# Patient Record
Sex: MALE | Race: BLACK OR AFRICAN AMERICAN | NOT HISPANIC OR LATINO | URBAN - METROPOLITAN AREA
[De-identification: names, ages, dates, MRNs, and addresses within clinical notes are randomized per-mention and may not be internally consistent; named-entity substitution may affect disease eponyms.]

---

## 2023-10-02 ENCOUNTER — OUTPATIENT (OUTPATIENT)
Dept: OUTPATIENT SERVICES | Facility: HOSPITAL | Age: 43
LOS: 1 days | Discharge: HOME | End: 2023-10-02

## 2024-07-21 ENCOUNTER — INPATIENT (INPATIENT)
Facility: HOSPITAL | Age: 44
LOS: 29 days | Discharge: HOME | End: 2024-08-20
Attending: PSYCHIATRY & NEUROLOGY | Admitting: PSYCHIATRY & NEUROLOGY
Payer: COMMERCIAL

## 2024-07-21 VITALS
RESPIRATION RATE: 16 BRPM | DIASTOLIC BLOOD PRESSURE: 75 MMHG | TEMPERATURE: 99 F | OXYGEN SATURATION: 99 % | HEIGHT: 70 IN | WEIGHT: 160.06 LBS | HEART RATE: 61 BPM | SYSTOLIC BLOOD PRESSURE: 122 MMHG

## 2024-07-21 DIAGNOSIS — F29 UNSPECIFIED PSYCHOSIS NOT DUE TO A SUBSTANCE OR KNOWN PHYSIOLOGICAL CONDITION: ICD-10-CM

## 2024-07-21 LAB
ADD ON TEST-SPECIMEN IN LAB: SIGNIFICANT CHANGE UP
ALBUMIN SERPL ELPH-MCNC: 4 G/DL — SIGNIFICANT CHANGE UP (ref 3.3–5)
ALP SERPL-CCNC: 76 U/L — SIGNIFICANT CHANGE UP (ref 40–120)
ALT FLD-CCNC: 12 U/L — SIGNIFICANT CHANGE UP (ref 4–41)
AMPHET UR-MCNC: NEGATIVE — SIGNIFICANT CHANGE UP
ANION GAP SERPL CALC-SCNC: 16 MMOL/L — HIGH (ref 7–14)
APAP SERPL-MCNC: <10 UG/ML — LOW (ref 15–25)
APPEARANCE UR: CLEAR — SIGNIFICANT CHANGE UP
AST SERPL-CCNC: 21 U/L — SIGNIFICANT CHANGE UP (ref 4–40)
BARBITURATES UR SCN-MCNC: NEGATIVE — SIGNIFICANT CHANGE UP
BASOPHILS # BLD AUTO: 0.01 K/UL — SIGNIFICANT CHANGE UP (ref 0–0.2)
BASOPHILS NFR BLD AUTO: 0.1 % — SIGNIFICANT CHANGE UP (ref 0–2)
BENZODIAZ UR-MCNC: NEGATIVE — SIGNIFICANT CHANGE UP
BILIRUB SERPL-MCNC: 0.8 MG/DL — SIGNIFICANT CHANGE UP (ref 0.2–1.2)
BILIRUB UR-MCNC: NEGATIVE — SIGNIFICANT CHANGE UP
BUN SERPL-MCNC: 12 MG/DL — SIGNIFICANT CHANGE UP (ref 7–23)
CALCIUM SERPL-MCNC: 9.3 MG/DL — SIGNIFICANT CHANGE UP (ref 8.4–10.5)
CHLORIDE SERPL-SCNC: 107 MMOL/L — SIGNIFICANT CHANGE UP (ref 98–107)
CO2 SERPL-SCNC: 20 MMOL/L — LOW (ref 22–31)
COCAINE METAB.OTHER UR-MCNC: NEGATIVE — SIGNIFICANT CHANGE UP
COLOR SPEC: YELLOW — SIGNIFICANT CHANGE UP
CREAT SERPL-MCNC: 1.38 MG/DL — HIGH (ref 0.5–1.3)
CREATININE URINE RESULT, DAU: 362 MG/DL — SIGNIFICANT CHANGE UP
DIFF PNL FLD: NEGATIVE — SIGNIFICANT CHANGE UP
EGFR: 65 ML/MIN/1.73M2 — SIGNIFICANT CHANGE UP
EOSINOPHIL # BLD AUTO: 0.04 K/UL — SIGNIFICANT CHANGE UP (ref 0–0.5)
EOSINOPHIL NFR BLD AUTO: 0.5 % — SIGNIFICANT CHANGE UP (ref 0–6)
ETHANOL SERPL-MCNC: <10 MG/DL — SIGNIFICANT CHANGE UP
FENTANYL UR QL SCN: NEGATIVE — SIGNIFICANT CHANGE UP
GLUCOSE SERPL-MCNC: 123 MG/DL — HIGH (ref 70–99)
GLUCOSE UR QL: NEGATIVE MG/DL — SIGNIFICANT CHANGE UP
HCT VFR BLD CALC: 46 % — SIGNIFICANT CHANGE UP (ref 39–50)
HGB BLD-MCNC: 14.7 G/DL — SIGNIFICANT CHANGE UP (ref 13–17)
IANC: 5.48 K/UL — SIGNIFICANT CHANGE UP (ref 1.8–7.4)
IMM GRANULOCYTES NFR BLD AUTO: 0.3 % — SIGNIFICANT CHANGE UP (ref 0–0.9)
KETONES UR-MCNC: ABNORMAL MG/DL
LEUKOCYTE ESTERASE UR-ACNC: NEGATIVE — SIGNIFICANT CHANGE UP
LYMPHOCYTES # BLD AUTO: 1.68 K/UL — SIGNIFICANT CHANGE UP (ref 1–3.3)
LYMPHOCYTES # BLD AUTO: 22 % — SIGNIFICANT CHANGE UP (ref 13–44)
MCHC RBC-ENTMCNC: 27.7 PG — SIGNIFICANT CHANGE UP (ref 27–34)
MCHC RBC-ENTMCNC: 32 GM/DL — SIGNIFICANT CHANGE UP (ref 32–36)
MCV RBC AUTO: 86.6 FL — SIGNIFICANT CHANGE UP (ref 80–100)
METHADONE UR-MCNC: NEGATIVE — SIGNIFICANT CHANGE UP
MONOCYTES # BLD AUTO: 0.42 K/UL — SIGNIFICANT CHANGE UP (ref 0–0.9)
MONOCYTES NFR BLD AUTO: 5.5 % — SIGNIFICANT CHANGE UP (ref 2–14)
NEUTROPHILS # BLD AUTO: 5.48 K/UL — SIGNIFICANT CHANGE UP (ref 1.8–7.4)
NEUTROPHILS NFR BLD AUTO: 71.6 % — SIGNIFICANT CHANGE UP (ref 43–77)
NITRITE UR-MCNC: NEGATIVE — SIGNIFICANT CHANGE UP
NRBC # BLD: 0 /100 WBCS — SIGNIFICANT CHANGE UP (ref 0–0)
NRBC # FLD: 0 K/UL — SIGNIFICANT CHANGE UP (ref 0–0)
OPIATES UR-MCNC: NEGATIVE — SIGNIFICANT CHANGE UP
OXYCODONE UR-MCNC: NEGATIVE — SIGNIFICANT CHANGE UP
PCP SPEC-MCNC: SIGNIFICANT CHANGE UP
PCP UR-MCNC: NEGATIVE — SIGNIFICANT CHANGE UP
PH UR: 5.5 — SIGNIFICANT CHANGE UP (ref 5–8)
PLATELET # BLD AUTO: 158 K/UL — SIGNIFICANT CHANGE UP (ref 150–400)
POTASSIUM SERPL-MCNC: 3.6 MMOL/L — SIGNIFICANT CHANGE UP (ref 3.5–5.3)
POTASSIUM SERPL-SCNC: 3.6 MMOL/L — SIGNIFICANT CHANGE UP (ref 3.5–5.3)
PROT SERPL-MCNC: 6.7 G/DL — SIGNIFICANT CHANGE UP (ref 6–8.3)
PROT UR-MCNC: NEGATIVE MG/DL — SIGNIFICANT CHANGE UP
RBC # BLD: 5.31 M/UL — SIGNIFICANT CHANGE UP (ref 4.2–5.8)
RBC # FLD: 14.2 % — SIGNIFICANT CHANGE UP (ref 10.3–14.5)
SALICYLATES SERPL-MCNC: <0.3 MG/DL — LOW (ref 15–30)
SARS-COV-2 RNA SPEC QL NAA+PROBE: SIGNIFICANT CHANGE UP
SODIUM SERPL-SCNC: 143 MMOL/L — SIGNIFICANT CHANGE UP (ref 135–145)
SP GR SPEC: 1.02 — SIGNIFICANT CHANGE UP (ref 1–1.03)
THC UR QL: POSITIVE
TOXICOLOGY SCREEN, DRUGS OF ABUSE, SERUM RESULT: SIGNIFICANT CHANGE UP
TSH SERPL-MCNC: 4.48 UIU/ML — HIGH (ref 0.27–4.2)
UROBILINOGEN FLD QL: 0.2 MG/DL — SIGNIFICANT CHANGE UP (ref 0.2–1)
WBC # BLD: 7.65 K/UL — SIGNIFICANT CHANGE UP (ref 3.8–10.5)
WBC # FLD AUTO: 7.65 K/UL — SIGNIFICANT CHANGE UP (ref 3.8–10.5)

## 2024-07-21 PROCEDURE — 99285 EMERGENCY DEPT VISIT HI MDM: CPT

## 2024-07-21 PROCEDURE — 99285 EMERGENCY DEPT VISIT HI MDM: CPT | Mod: GC

## 2024-07-21 RX ORDER — RISPERIDONE 0.25 MG/1
1 TABLET, FILM COATED ORAL AT BEDTIME
Refills: 0 | Status: DISCONTINUED | OUTPATIENT
Start: 2024-07-21 | End: 2024-07-23

## 2024-07-21 RX ADMIN — RISPERIDONE 1 MILLIGRAM(S): 0.25 TABLET, FILM COATED ORAL at 21:21

## 2024-07-21 NOTE — ED BEHAVIORAL HEALTH ASSESSMENT NOTE - NSBHMSETHTPROC_PSY_A_CORE
Disorganized/Perseverative/Illogical/Impaired reasoning Linear/Perseverative/Illogical/Impaired reasoning

## 2024-07-21 NOTE — ED BEHAVIORAL HEALTH ASSESSMENT NOTE - PSYCHIATRIC ISSUES AND PLAN (INCLUDE STANDING AND PRN MEDICATION)
Start Risperdal 1 mg qhs; For agitation prns: PO/IM Haldol 5 mg q 6h, Ativan 2 mg q 6 h Start Risperdal 1 mg qhs; For agitation prns: PO/IM Haldol 5 mg q 4h, Ativan 2 mg q 4h

## 2024-07-21 NOTE — ED BEHAVIORAL HEALTH ASSESSMENT NOTE - SUMMARY
Patient is a 44 year old male, single, domiciled in [place] with his mother, has a 22 y/o son, previously a  in [place], currently works making oils, unknown PPHx as patient denies previous diagnoses, and hospitalizations, no prior suicide attempts, smokes cigars socially, denies other substances, denies PMH brought in by EMS, activated by patient's mother during an argument.     On assessment, patient is presenting with symptoms of psychosis including AVH, paranoia, and delusions. Patient has insomnia, stating that he hasn't been able to sleep because of constantly hearing the voice of his mother's nephew. Patient has poor insight and isn't currently in treatment for psychosis. Patient's mother called EMS today d/t concern as patient is now paranoid that she is involved with her nephew who he believes is trying to control the him. He requires involuntary hospitalization for medication and stabilization.    PLAN   - Admit on 9.27, currently boarding   - 1:1 not required   - Start Risperdal 1 mg qhs   - For agitation prns: PO/IM Haldol 5 mg q 6h, Ativan 2 mg q 6 h Patient is a 44 year old male, single, domiciled in [place] with his mother, has a 20 y/o son, previously a  in [place], currently works making oils, unknown PPHx as patient denies previous diagnoses and hospitalizations, but may have a hx of schizophrenia per mother, not on medications, no prior suicide attempts, smokes cigars socially, denies other substances though Utox + for THC in the ED, denies PMHx, brought in by EMS, activated by patient's mother during an argument.     On assessment, patient is presenting with symptoms of psychosis including AH, VH, paranoia towards his mother, with whom he lives, and delusions of thought insertion and body control. Patient endorses insomnia, stating that he hasn't been able to sleep because of constantly hearing the voice of his mother's nephew, which he believes is coming through a device that was planted in his head. Patient has poor insight and is not currently in any psychiatric treatment. Patient's mother called EMS today due to concern that patient's paranoia is worsening, as patient now believes that she is involved with her nephew who he believes is trying to control the him. He requires involuntary hospitalization for medication and stabilization.    PLAN  - Admit on 9.27, currently boarding  - routine observation, patient denies SI/HI  - monitor EKG for QTc, if QTc < 500, start Risperdal 1 mg qhs for psychosis  - For agitation prns: PO/IM Haldol 5 mg/ Ativan 2mg q4h   - of note for dispo planning purposes, pt's mother is going to [place] for 2 weeks starting 7/22, pt's house key is being held by relative Sonu

## 2024-07-21 NOTE — ED ADULT NURSE NOTE - NSFALLUNIVINTERV_ED_ALL_ED
Bed/Stretcher in lowest position, wheels locked, appropriate side rails in place/Call bell, personal items and telephone in reach/Instruct patient to call for assistance before getting out of bed/chair/stretcher/Non-slip footwear applied when patient is off stretcher/[place] to call system/Physically safe environment - no spills, clutter or unnecessary equipment/Purposeful proactive rounding/Room/bathroom lighting operational, light cord in reach

## 2024-07-21 NOTE — ED ADULT NURSE NOTE - NS ED NURSE RECORD ANOTHER VITAL SIGN
Continue with Buspar and Effexor  Follow up with mental health for assessment and medication management  Yes

## 2024-07-21 NOTE — ED BEHAVIORAL HEALTH ASSESSMENT NOTE - DIFFERENTIAL
Schizophrenia, Psychosis unspecified, Schizoaffective d/o Schizophrenia vs Psychosis unspecified vs Schizoaffective d/o, r/o substance-induced psychosis

## 2024-07-21 NOTE — ED BEHAVIORAL HEALTH ASSESSMENT NOTE - DETAILS
Pt denies SI/HI Pt boarding Spoke with patient's mother about plan. deferred maternal uncle with bipolar disorder

## 2024-07-21 NOTE — ED ADULT TRIAGE NOTE - NS ED NURSE AMBULANCES
[facility]
Airway patent, Nasal mucosa clear. Mouth with normal mucosa. Throat has no vesicles, no oropharyngeal exudates and uvula is midline. +L tonsillar swelling, L neck lymph node swelling

## 2024-07-21 NOTE — ED BEHAVIORAL HEALTH ASSESSMENT NOTE - HPI (INCLUDE ILLNESS QUALITY, SEVERITY, DURATION, TIMING, CONTEXT, MODIFYING FACTORS, ASSOCIATED SIGNS AND SYMPTOMS)
Patient is a 44 year old male, single, domiciled in [place] with his mother, has a 20 y/o son, previously a  in [place], currently works making oils, unknown PPHx as patient denies previous diagnoses, and hospitalizations, no prior suicide attempts, smokes cigars socially, denies other substances, denies PMH brought in by EMS, activated by patient's mother during an argument.     On interview, patient reports that there is something speaking in his ears and trying to control him. He states that he believes the voice is from his mother's nephew, who he believes is trying to taunt and make fun of him because he is jealous of the patient. He endorses visual hallucination of his mother's nephew as well. He states that he has been hearing voices for the past 4 years, but denies history of medications or diagnoses. He reveals paranoia that his mother is also involved with his nephew trying to control him. He reports that there is a device in his head that he believes is present because of his mother's nephew. He endorses thought insertion, thought deletion, and thought broadcasting. He endorses insomnia, stating that he has slept about 4 hours or less per night and doesn't remember the last time he slept well because it's difficult for him to sleep with the voice. He denies symptoms of cory and depression including grandiosity, racing thoughts, distractibility, increased energy, depressed or elevated mood, anhedonia, and appetite changes. Pt states that he can't remember his mother's phone number for collateral and deleted her phone number because he believes she's involved with her nephew who he believes is trying to control him. He denies SI/HI.    See below for collateral from patient's mother, Adria Prakash ([phone]).  Pt's mother states that the patient has been hearing a voice that sounds like her nephew and accusing her of working with her nephew to control the patient. Mother states that she called EMS today because patient arguing with her and yelling. Mother states that the patient has been hearing voices for years off and on. She states that she's unsure if the patient has seen a psychiatrist before but states that the patient went to a doctor in [facility] and was told that he has Schizophrenia during a time when the patient was hearing voices, but patient didn't believe the doctor. Mother states that patient's maternal uncle has bipolar. Mother states that she is going to [place] tomorrow and may not be available if she is called since she isn't sure how the service there will be. Patient is a 44 year old male, single, domiciled in [place] with his mother, has a 20 y/o son, previously a  in [place], currently works making oils, unknown PPHx as patient denies previous diagnoses, and hospitalizations, no prior suicide attempts, smokes cigars socially, denies other substances, denies PMH brought in by EMS, activated by patient's mother during an argument.     On interview, patient reports that there is something speaking in his ears and trying to control him. He states that he believes the voice is from his mother's nephew, who he believes is trying to taunt and make fun of him because he is jealous of the patient. He states that at time, he feels that the voice is "closing my ears and throat," and that he has to put pressure on his neck in order to breathe (demonstrates putting pressure over both carotids and also trying to pull his trachea aggressively to the side of his neck). He endorses visual hallucination of his mother's nephew as well. He states that he has been hearing voices for the past 4 years, but denies history of psychiatric medications or psychiatric diagnoses. He reports concern that his mother is also involved with his nephew trying to control him. He reports that there is a device in his head that he believes is present because of his mother's nephew. He endorses delusions of thought insertion, thought deletion, thought broadcasting, and body control, though does state that he is able to resist the thoughts that attempt to control his actions with some concentration/effort. He endorses poor sleep due to the AH, stating that he has slept about 4 hours or less per night and doesn't remember the last time he slept well because it's difficult for him to sleep with the voice. When offered medication to help decrease the voices so he can sleep, he says that he does not need medication but instead needs us to examine his ears so we can see where the device creating the voice is implanted in him. He denies symptoms of cory and depression including grandiosity, racing thoughts, distractibility, increased energy, depressed or elevated mood, anhedonia, and appetite changes. Pt states that he can't remember his mother's phone number for collateral and deleted her phone number because he believes she's involved with her nephew who he believes is trying to control him. He denies SI/HI.    See below for collateral from patient's mother, Adria Prakash ([phone]).  Pt's mother states that the patient has been hearing a voice that sounds like her nephew and accusing her of working with her nephew to control the patient. Mother states that she called EMS today because patient arguing with her and yelling. Mother states that the patient has been hearing voices for years off and on. She states that she's unsure if the patient has seen a psychiatrist before but states that the patient went to a doctor in [facility] and was told that he has schizophrenia during a time when the patient was hearing voices, but patient didn't believe the doctor. Mother states that patient's maternal uncle has bipolar disorder. Mother states that she is going to [place] tomorrow and may not be available if she is called since she isn't sure how the service there will be. Patient is a 44 year old male, single, domiciled in [place] with his mother, has a 20 y/o son, previously a  in [place], currently works making oils, unknown PPHx as patient denies previous diagnoses, and hospitalizations, no prior suicide attempts, smokes cigars socially, denies other substances, denies PMH brought in by EMS, activated by patient's mother during an argument.     On interview, patient reports that there is something speaking in his ears and trying to control him. He states that he believes the voice is from his mother's nephew, who he believes is trying to taunt and make fun of him because he is jealous of the patient. He states that at time, he feels that the voice is "closing my ears and throat," and that he has to put pressure on his neck in order to breathe (demonstrates putting pressure over both carotids and also trying to pull his trachea aggressively to the side of his neck). He endorses visual hallucination of his mother's nephew as well. He states that he has been hearing voices for the past 4 years, but denies history of psychiatric medications or psychiatric diagnoses. He reports concern that his mother is also involved with his nephew trying to control him. He reports that there is a device in his head that he believes is present because of his mother's nephew. He endorses delusions of thought insertion, thought deletion, thought broadcasting, and body control, though does state that he is able to resist the thoughts that attempt to control his actions with some concentration/effort. He endorses poor sleep due to the AH, stating that he has slept about 4 hours or less per night and doesn't remember the last time he slept well because it's difficult for him to sleep with the voice. When offered medication to help decrease the voices so he can sleep, he says that he does not need medication but instead needs us to examine his ears so we can see where the device creating the voice is implanted in him. He denies symptoms of cory and depression including grandiosity, racing thoughts, distractibility, increased energy, depressed or elevated mood, anhedonia, and appetite changes. Pt states that he can't remember his mother's phone number for collateral and deleted her phone number because he believes she's involved with her nephew who he believes is trying to control him. He denies SI/HI.    See below for collateral from patient's mother, Adria Prakash ([phone]).  Pt's mother states that the patient has been hearing a voice that sounds like her nephew and accusing her of working with her nephew to control the patient. Mother states that she called EMS today because patient arguing with her and yelling. Mother states that the patient has been hearing voices for years off and on. She states that she's unsure if the patient has seen a psychiatrist before but states that the patient went to a doctor in [facility] and was told that he has schizophrenia during a time when the patient was hearing voices, but patient didn't believe the doctor. Mother states that patient's maternal uncle has bipolar disorder. Mother states that she is going to [place] tomorrow and may not be available if she is called since she isn't sure how the service there will be.

## 2024-07-21 NOTE — ED ADULT TRIAGE NOTE - CHIEF COMPLAINT QUOTE
states hearing voices from devices in ear police  called by mom because of argument.  voices confuse and bully me

## 2024-07-21 NOTE — ED BEHAVIORAL HEALTH ASSESSMENT NOTE - OTHER PAST PSYCHIATRIC HISTORY (INCLUDE DETAILS REGARDING ONSET, COURSE OF ILLNESS, INPATIENT/OUTPATIENT TREATMENT)
Pt denies PPHx including previous diagnoses, hospitalizations, and medications. Pt denies PPHx including previous diagnoses, hospitalizations, and medications. However, per mother, may have been diagnosed with schizophrenia in [place].

## 2024-07-21 NOTE — ED PROVIDER NOTE - OBJECTIVE STATEMENT
44  year-old M BIBA  secondary to recurrent command auditory hallucination.  States' I can't deal with the voices anymore, they are trying to control me".  Denies falling, punching, or kicking any objects. Denies pain, SOB, fever, chills, and chest/abdominal discomfort. Denies SI/HI/VH. Denies the use of alcohol or illegal drugs. There is no sign of physical injury, broken skin, or deformity. 44  year-old M BIBA  secondary to recurrent command auditory hallucination.  States' I can't deal with the voices anymore, they are trying to control me".  Denies falling, punching, or kicking any objects. Admit that someone planted a chit in his head.  Denies pain, SOB, fever, chills, and chest/abdominal discomfort. Denies SI/HI/VH. Denies the use of alcohol or illegal drugs. There is no sign of physical injury, broken skin, or deformity.

## 2024-07-21 NOTE — ED BEHAVIORAL HEALTH ASSESSMENT NOTE - RISK ASSESSMENT
Risk factors: +not receiving treatment    Protective factors: no current SIIP/HIIP, no h/o SA/SIB, no h/o psych admissions, no access to weapons, good physical health, engaged in work, spirituality, domiciled, social supports    Overall, pt is at moderate risk of harm and meets criteria for psychiatric admission. At this time, patient presents with elevated acute risk of harm to self or others given active AH interfering with sleep, paranoid delusions towards family including his mother with whom he lives, ongoing cannabis use, and lack of insight into the need for treatment. He is not currently connected to any psychiatric care. He has several protective factors that help mitigate his risk, including denial of current SI/HI, no hx of SA/SIB, no access to weapons, engaged in work, spirituality, domiciled, and social support from mother. Currently, he meets criteria for involuntary psychiatric admission for safety and stabilization.

## 2024-07-21 NOTE — ED BEHAVIORAL HEALTH ASSESSMENT NOTE - NSBHATTESTCOMMENTATTENDFT_PSY_A_CORE
Patient is a 44 year old male, single, domiciled in [place] with his mother, has a 22 y/o son, previously a  in [place], currently works making oils, unknown PPHx as patient denies previous diagnoses and hospitalizations, but may have a hx of schizophrenia per mother, not on medications, no prior suicide attempts, smokes cigars socially, denies other substances though Utox + for THC in the ED, denies PMHx, brought in by EMS, activated by patient's mother during an argument.     Pt presents with the delusion that a device is implanted in his head causing him to hear the voice of his cousin that at times predicts and controls his actions. Delusion is expanding to now include paranoia towards his cousin and mother, with whom he lives. Reports AH disrupts his sleep and has not gotten more than 4 hours/night in a long time. Pt has no insight into the need for treatment and denies all psychiatric history despite his mother's report that he has been diagnosed with schizophrenia. Pt requires psychiatric admission for safety and stabilization. Agree with resident's plan as above.

## 2024-07-21 NOTE — ED ADULT NURSE NOTE - OBJECTIVE STATEMENT
pt received to , aox4.  pt c/o "I hear voices in my head controlling everything I do"  pt states sometimes the voices make it hard for him to breathe and he has to grab his neck to breathe.  pt also states there is a "chip" inside his ear with a microphone that is his relatives voices telling him what to do and they "never sleep". belongings secured, pt denies SI/HI.

## 2024-07-21 NOTE — ED PROVIDER NOTE - CLINICAL SUMMARY MEDICAL DECISION MAKING FREE TEXT BOX
Pt is aaox3. Pupils are still unequal. 4mm,Disoriented currently to time. Pt had no acute events today. Swallow study completed. Pt was started on renal mechanical soft diet. No issues with swallowing or drinking. Daughter, Alea assisted pt with a bath and pt was able to tolerate sitting in the chair and working with pt. PD catheter was not placed today. One unit of PRBC was given for low h/h. Safety maintained.    44  year-old M BIBA  secondary to recurrent command auditory hallucination.  States' I can't deal with the voices anymore, they are trying to control me".  Denies falling, punching, or kicking any objects. Denies pain, SOB, fever, chills, and chest/abdominal discomfort. Denies SI/HI/VH. Denies the use of alcohol or illegal drugs. There is no sign of physical injury, broken skin, or deformity.    Labs, Urine Tox/UA, EKG. Hydrate    Medical evaluation performed. There is no clinical evidence of intoxication or any acute medical problem requiring immediate intervention. Patient is awaiting psychiatric consultation. Final disposition will be determined by psychiatrist.

## 2024-07-22 RX ADMIN — RISPERIDONE 1 MILLIGRAM(S): 0.25 TABLET, FILM COATED ORAL at 22:20

## 2024-07-23 DIAGNOSIS — F20.9 SCHIZOPHRENIA, UNSPECIFIED: ICD-10-CM

## 2024-07-23 DIAGNOSIS — F12.90 CANNABIS USE, UNSPECIFIED, UNCOMPLICATED: ICD-10-CM

## 2024-07-23 PROCEDURE — 99213 OFFICE O/P EST LOW 20 MIN: CPT

## 2024-07-23 PROCEDURE — 99222 1ST HOSP IP/OBS MODERATE 55: CPT

## 2024-07-23 RX ORDER — HALOPERIDOL 1 MG
5 TABLET ORAL ONCE
Refills: 0 | Status: DISCONTINUED | OUTPATIENT
Start: 2024-07-23 | End: 2024-08-20

## 2024-07-23 RX ORDER — PALIPERIDONE 3 MG/1
6 TABLET, EXTENDED RELEASE ORAL AT BEDTIME
Refills: 0 | Status: DISCONTINUED | OUTPATIENT
Start: 2024-07-24 | End: 2024-07-29

## 2024-07-23 RX ORDER — LORAZEPAM 4 MG/ML
2 INJECTION INTRAMUSCULAR; INTRAVENOUS EVERY 4 HOURS
Refills: 0 | Status: DISCONTINUED | OUTPATIENT
Start: 2024-07-23 | End: 2024-07-24

## 2024-07-23 RX ORDER — GABAPENTIN 100 MG
300 CAPSULE ORAL
Refills: 0 | Status: DISCONTINUED | OUTPATIENT
Start: 2024-07-23 | End: 2024-08-20

## 2024-07-23 RX ORDER — HALOPERIDOL 1 MG
5 TABLET ORAL EVERY 4 HOURS
Refills: 0 | Status: DISCONTINUED | OUTPATIENT
Start: 2024-07-23 | End: 2024-08-20

## 2024-07-23 RX ORDER — LORAZEPAM 4 MG/ML
2 INJECTION INTRAMUSCULAR; INTRAVENOUS ONCE
Refills: 0 | Status: DISCONTINUED | OUTPATIENT
Start: 2024-07-23 | End: 2024-07-24

## 2024-07-23 RX ORDER — PALIPERIDONE 3 MG/1
3 TABLET, EXTENDED RELEASE ORAL AT BEDTIME
Refills: 0 | Status: COMPLETED | OUTPATIENT
Start: 2024-07-23 | End: 2024-07-23

## 2024-07-23 RX ORDER — RISPERIDONE 0.25 MG/1
2 TABLET, FILM COATED ORAL AT BEDTIME
Refills: 0 | Status: DISCONTINUED | OUTPATIENT
Start: 2024-07-23 | End: 2024-07-23

## 2024-07-23 RX ADMIN — PALIPERIDONE 3 MILLIGRAM(S): 3 TABLET, EXTENDED RELEASE ORAL at 20:25

## 2024-07-23 NOTE — BH SOCIAL WORK INITIAL PSYCHOSOCIAL EVALUATION - OTHER PAST PSYCHIATRIC HISTORY (INCLUDE DETAILS REGARDING ONSET, COURSE OF ILLNESS, INPATIENT/OUTPATIENT TREATMENT)
Patient is a 44 year old male, single, domiciled in [place] with his mother, has a 20 y/o son, previously a  in [place], currently works making oils, unknown PPHx as patient denies previous diagnoses, and hospitalizations, no prior suicide attempts, smokes cigars socially, denies other substances, denies PMH brought in by EMS, activated by patient's mother during an argument.     On interview, patient reports that there is something speaking in his ears and trying to control him. He states that he believes the voice is from his mother's nephew, who he believes is trying to taunt and make fun of him because he is jealous of the patient. He states that at time, he feels that the voice is "closing my ears and throat," and that he has to put pressure on his neck in order to breathe (demonstrates putting pressure over both carotids and also trying to pull his trachea aggressively to the side of his neck). He endorses visual hallucination of his mother's nephew as well. He states that he has been hearing voices for the past 4 years, but denies history of psychiatric medications or psychiatric diagnoses. He reports concern that his mother is also involved with his nephew trying to control him. He reports that there is a device in his head that he believes is present because of his mother's nephew. He endorses delusions of thought insertion, thought deletion, thought broadcasting, and body control, though does state that he is able to resist the thoughts that attempt to control his actions with some concentration/effort. He endorses poor sleep due to the AH, stating that he has slept about 4 hours or less per night and doesn't remember the last time he slept well because it's difficult for him to sleep with the voice. When offered medication to help decrease the voices so he can sleep, he says that he does not need medication but instead needs us to examine his ears so we can see where the device creating the voice is implanted in him. He denies symptoms of cory and depression including grandiosity, racing thoughts, distractibility, increased energy, depressed or elevated mood, anhedonia, and appetite changes. Pt states that he can't remember his mother's phone number for collateral and deleted her phone number because he believes she's involved with her nephew who he believes is trying to control him. He denies SI/HI.    See below for collateral from patient's mother, Adria Prakash ([phone]).  Pt's mother states that the patient has been hearing a voice that sounds like her nephew and accusing her of working with her nephew to control the patient. Mother states that she called EMS today because patient arguing with her and yelling. Mother states that the patient has been hearing voices for years off and on. She states that she's unsure if the patient has seen a psychiatrist before but states that the patient went to a doctor in [facility] and was told that he has schizophrenia during a time when the patient was hearing voices, but patient didn't believe the doctor. Mother states that patient's maternal uncle has bipolar disorder. Mother states that she is going to [place] tomorrow and may not be available if she is called since she isn't sure how the service there will be.    Pt just arrived to [facility], so writer will meet with him tomorrow to get consents signed. Patient is a 44 year old male, single, domiciled in [place] with his mother, has a 20 y/o son, previously a  in [place], currently works making oils, unknown PPHx as patient denies previous diagnoses, and hospitalizations, no prior suicide attempts, smokes cigars socially, denies other substances, denies PMH brought in by EMS, activated by patient's mother during an argument.     On interview, patient reports that there is something speaking in his ears and trying to control him. He states that he believes the voice is from his mother's nephew, who he believes is trying to taunt and make fun of him because he is jealous of the patient. He states that at time, he feels that the voice is "closing my ears and throat," and that he has to put pressure on his neck in order to breathe (demonstrates putting pressure over both carotids and also trying to pull his trachea aggressively to the side of his neck). He endorses visual hallucination of his mother's nephew as well. He states that he has been hearing voices for the past 4 years, but denies history of psychiatric medications or psychiatric diagnoses. He reports concern that his mother is also involved with his nephew trying to control him. He reports that there is a device in his head that he believes is present because of his mother's nephew. He endorses delusions of thought insertion, thought deletion, thought broadcasting, and body control, though does state that he is able to resist the thoughts that attempt to control his actions with some concentration/effort. He endorses poor sleep due to the AH, stating that he has slept about 4 hours or less per night and doesn't remember the last time he slept well because it's difficult for him to sleep with the voice. When offered medication to help decrease the voices so he can sleep, he says that he does not need medication but instead needs us to examine his ears so we can see where the device creating the voice is implanted in him. He denies symptoms of cory and depression including grandiosity, racing thoughts, distractibility, increased energy, depressed or elevated mood, anhedonia, and appetite changes. Pt states that he can't remember his mother's phone number for collateral and deleted her phone number because he believes she's involved with her nephew who he believes is trying to control him. He denies SI/HI.    See below for collateral from patient's mother, Adria Prakash ([phone]).  Pt's mother states that the patient has been hearing a voice that sounds like her nephew and accusing her of working with her nephew to control the patient. Mother states that she called EMS today because patient arguing with her and yelling. Mother states that the patient has been hearing voices for years off and on. She states that she's unsure if the patient has seen a psychiatrist before but states that the patient went to a doctor in [facility] and was told that he has schizophrenia during a time when the patient was hearing voices, but patient didn't believe the doctor. Mother states that patient's maternal uncle has bipolar disorder. Mother states that she is going to [place] tomorrow and may not be available if she is called since she isn't sure how the service there will be.    Writer met with pt on [facility] to get consent signed for team to speak with his mother, but he refused to sign, citing his mother the reason for him being at [facility].  Pt presents oddly related and is actively having .

## 2024-07-23 NOTE — BH INPATIENT PSYCHIATRY ASSESSMENT NOTE - OTHER PAST PSYCHIATRIC HISTORY (INCLUDE DETAILS REGARDING ONSET, COURSE OF ILLNESS, INPATIENT/OUTPATIENT TREATMENT)
Pt denies PPHx including previous diagnoses, hospitalizations, and medications. However, per mother, may have been diagnosed with schizophrenia in [place].

## 2024-07-23 NOTE — ED BEHAVIORAL HEALTH PROGRESS NOTE - BILLING CODES
99283-Emergency department visit - moderate complexity, non-urgent evaluation
99283-Emergency department visit - moderate complexity, non-urgent evaluation

## 2024-07-23 NOTE — BH INPATIENT PSYCHIATRY ASSESSMENT NOTE - RISK ASSESSMENT
At this time, patient presents with elevated acute risk of harm to self or others given active AH interfering with sleep, paranoid delusions towards family including his mother with whom he lives, ongoing cannabis use, and lack of insight into the need for treatment. He is not currently connected to any psychiatric care. He has several protective factors that help mitigate his risk, including denial of current SI/HI, no hx of SA/SIB, no access to weapons, engaged in work, spirituality, domiciled, and social support from mother. Currently, he meets criteria for involuntary psychiatric admission for safety and stabilization.

## 2024-07-23 NOTE — BH PATIENT PROFILE - STATED REASON FOR ADMISSION
Pt stated his mom called 911 on him. He states he and mom were arguing. He states he hears voices and is upset that mom denies him hearing voices in the past. He states he feels he has a chip in his head and that it was implanted by his cousin, and is

## 2024-07-23 NOTE — ED BEHAVIORAL HEALTH PROGRESS NOTE - DETAILS:
Still continues to hear voice of mother's nephew.   Appears internally preoccupied in the ED.   Requires hospitalization. 
Patient reports that while he's been in the ED he continues to hear his mother's newphew speak to him. He admits that the voice has told him to hurt himself or others.   He hat times will hit his ear, to "try to turn the chip off"  Notified of hospitalization   accepted Risperdal 1mg po qhs last night.

## 2024-07-23 NOTE — BH INPATIENT PSYCHIATRY ASSESSMENT NOTE - HPI (INCLUDE ILLNESS QUALITY, SEVERITY, DURATION, TIMING, CONTEXT, MODIFYING FACTORS, ASSOCIATED SIGNS AND SYMPTOMS)
Patient is a 44 year old male, single, domiciled in [place] with his mother, has a 20 y/o son, previously a  in [place], currently works making oils, unknown PPHx as patient denies previous diagnoses, and hospitalizations, no prior suicide attempts, smokes cigars socially, denies other substances, denies PMH brought in by EMS, activated by patient's mother during an argument.     On interview, patient reports that there is something speaking in his ears and trying to control him. He states that he believes the voice is from his mother's nephew, who he believes is trying to taunt and make fun of him because he is jealous of the patient. He states that at time, he feels that the voice is "closing my ears and throat," and that he has to put pressure on his neck in order to breathe (demonstrates putting pressure over both carotids and also trying to pull his trachea aggressively to the side of his neck). He endorses visual hallucination of his mother's nephew as well. He states that he has been hearing voices for the past 4 years, but denies history of psychiatric medications or psychiatric diagnoses. He reports concern that his mother is also involved with his nephew trying to control him. He reports that there is a device in his head that he believes is present because of his mother's nephew. He endorses delusions of thought insertion, thought deletion, thought broadcasting, and body control, though does state that he is able to resist the thoughts that attempt to control his actions with some concentration/effort. He endorses poor sleep due to the AH, stating that he has slept about 4 hours or less per night and doesn't remember the last time he slept well because it's difficult for him to sleep with the voice. When offered medication to help decrease the voices so he can sleep, he says that he does not need medication but instead needs us to examine his ears so we can see where the device creating the voice is implanted in him. He denies symptoms of cory and depression including grandiosity, racing thoughts, distractibility, increased energy, depressed or elevated mood, anhedonia, and appetite changes. Pt states that he can't remember his mother's phone number for collateral and deleted her phone number because he believes she's involved with her nephew who he believes is trying to control him. He denies SI/HI.    See below for collateral from patient's mother, Adria Prakash ([phone]).  Pt's mother states that the patient has been hearing a voice that sounds like her nephew and accusing her of working with her nephew to control the patient. Mother states that she called EMS today because patient arguing with her and yelling. Mother states that the patient has been hearing voices for years off and on. She states that she's unsure if the patient has seen a psychiatrist before but states that the patient went to a doctor in [facility] and was told that he has schizophrenia during a time when the patient was hearing voices, but patient didn't believe the doctor. Mother states that patient's maternal uncle has bipolar disorder. Mother states that she is going to [place] tomorrow and may not be available if she is called since she isn't sure how the service there will be.    On [facility], very little of ED note corroborated by pt. Appears rather guarded, evasive and paranoid, yet, seen sitting and talking with female peer on unit, Afghan speaking only, who is also quite paranoid. Says he got in argument with mother, and mother called police and pt BIBEMS to [facility] ED. Does not endorse any psych hx and does not refer to much of discussion in ED note for unclear reasons. 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 Asks for NRT, so gum ordered. Does not endorse wish to harm self or others, with adequate behavioral control. 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 Denies wish to harm any party.

## 2024-07-23 NOTE — BH INPATIENT PSYCHIATRY ASSESSMENT NOTE - NSBHMETABOLIC_PSY_ALL_CORE_FT
BMI: BMI (kg/m2): 23 (07-23-24 @ 11:45)  HbA1c:   Glucose:   BP: 108/61 (07-23-24 @ 07:34) (88/54 - 130/81)Vital Signs Last 24 Hrs  T(C): 36.6 (07-23-24 @ 07:34), Max: 36.6 (07-23-24 @ 00:49)  T(F): 97.9 (07-23-24 @ 07:34), Max: 97.9 (07-23-24 @ 00:49)  HR: 63 (07-23-24 @ 07:34) (60 - 63)  BP: 108/61 (07-23-24 @ 07:34) (108/61 - 113/74)  BP(mean): --  RR: 16 (07-23-24 @ 07:34) (16 - 16)  SpO2: 99% (07-23-24 @ 07:34) (99% - 99%)    Orthostatic VS  07-23-24 @ 11:45  Lying BP: --/-- HR: --  Sitting BP: 126/70 HR: 65  Standing BP: 116/74 HR: 68  Site: --  Mode: --    Lipid Panel:

## 2024-07-23 NOTE — ED ADULT NURSE REASSESSMENT NOTE - NS ED NURSE REASSESS COMMENT FT1
Break RN note- Patient calm and cooperative at this time. Patient breathing even and nonlabored. No acute distress. Awaiting bed. Safety maintained. Patient stable upon exiting the room.
Break RN note- Patient calm and cooperative. No acute distress. Patient appears comfortable. Safety maintained. Patient stable upon exiting the room.
Pt is still boarding for psych admission, no beds available. Currently asleep in [facility]. No issues noted.
Pt is still boarding for psych admission, no beds available. Currently awake in [facility]. No issues noted.
Pt sleeping comfortably in bed, respirations are even and unlabored, VSS, NAD. Pt continues to board, awaiting psych bed assignment
Report received from Day shift RN. pt. remains A&Ox4, awake and resting. pt. offers no new complaints at this time. no acute distress noted. respirations even and unlabored, equal rise and fall of chest noted. pt. calm, cooperative at this time. VS as noted via PCA. HS medications given as per orders.
Pt resting comfortably in  chairs, respirations are even and unlabored, vitals as charted. Pt endorsing no complaints at this time, pt is calm and cooperative. Pt is boarding awaiting [facility] bed assignment

## 2024-07-23 NOTE — ED BEHAVIORAL HEALTH PROGRESS NOTE - SUMMARY
Patient is a 44 year old male, single, domiciled in [place] with his mother, has a 20 y/o son, previously a  in [place], currently works making oils, unknown PPHx as patient denies previous diagnoses and hospitalizations, but may have a hx of schizophrenia per mother, not on medications, no prior suicide attempts, smokes cigars socially, denies other substances though Utox + for THC in the ED, denies PMHx, brought in by EMS, activated by patient's mother during an argument.     On assessment, patient is presenting with symptoms of psychosis including AH, VH, paranoia towards his mother, with whom he lives, and delusions of thought insertion and body control. Patient endorses insomnia, stating that he hasn't been able to sleep because of constantly hearing the voice of his mother's nephew, which he believes is coming through a device that was planted in his head. Patient has poor insight and is not currently in any psychiatric treatment. Patient's mother called EMS today due to concern that patient's paranoia is worsening, as patient now believes that she is involved with her nephew who he believes is trying to control the him. He requires involuntary hospitalization for medication and stabilization.    PLAN  - Admit on 9.27, currently boarding  - routine observation, patient denies SI/HI  - monitor EKG for QTc, if QTc < 500, start Risperdal 1 mg qhs for psychosis  - For agitation prns: PO/IM Haldol 5 mg/ Ativan 2mg q4h   - of note for dispo planning purposes, pt's mother is going to [place] for 2 weeks starting 7/22, pt's house key is being held by relative Sonu
Patient is a 44 year old male, single, domiciled in [place] with his mother, has a 20 y/o son, previously a  in [place], currently works making oils, unknown PPHx as patient denies previous diagnoses and hospitalizations, but may have a hx of schizophrenia per mother, not on medications, no prior suicide attempts, smokes cigars socially, denies other substances though Utox + for THC in the ED, denies PMHx, brought in by EMS, activated by patient's mother during an argument.     On assessment, patient is presenting with symptoms of psychosis including AH, VH, paranoia towards his mother, with whom he lives, and delusions of thought insertion and body control. Patient endorses insomnia, stating that he hasn't been able to sleep because of constantly hearing the voice of his mother's nephew, which he believes is coming through a device that was planted in his head. Patient has poor insight and is not currently in any psychiatric treatment. Patient's mother called EMS today due to concern that patient's paranoia is worsening, as patient now believes that she is involved with her nephew who he believes is trying to control the him. He requires involuntary hospitalization for medication and stabilization.    PLAN  - Admit on 9.27, currently boarding  - routine observation, patient denies SI/HI  - monitor EKG for QTc, if QTc < 500, start Risperdal 1 mg qhs for psychosis  - For agitation prns: PO/IM Haldol 5 mg/ Ativan 2mg q4h   - of note for dispo planning purposes, pt's mother is going to [place] for 2 weeks starting 7/22, pt's house key is being held by relative Sonu

## 2024-07-23 NOTE — BH INPATIENT PSYCHIATRY ASSESSMENT NOTE - DESCRIPTION
From [place]. Moved to [place] in [place]23. Lives with mom. 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 Has an adult son.

## 2024-07-23 NOTE — BH INPATIENT PSYCHIATRY ASSESSMENT NOTE - NSBHMSESPEECH_PSY_A_CORE
Medical Necessity Clause: This procedure was medically necessary because the lesions that were treated were:
Include Z78.9 (Other Specified Conditions Influencing Health Status) As An Associated Diagnosis?: No
Show Applicator Variable?: Yes
Spray Paint Text: The liquid nitrogen was applied to the skin utilizing a spray paint frosting technique.
Medical Necessity Information: It is in your best interest to select a reason for this procedure from the list below. All of these items fulfill various CMS LCD requirements except the new and changing color options.
Consent: The patient's consent was obtained including but not limited to risks of crusting, scabbing, blistering, scarring, darker or lighter pigmentary change, recurrence, incomplete removal and infection.
Post-Care Instructions: I reviewed with the patient in detail post-care instructions. Patient is to wear sunprotection, and avoid picking at any of the treated lesions. Pt may apply Vaseline to crusted or scabbing areas.
Detail Level: Simple
Normal volume, rate, productivity, spontaneity and articulation

## 2024-07-23 NOTE — BH INPATIENT PSYCHIATRY ASSESSMENT NOTE - CURRENT MEDICATION
MEDICATIONS  (STANDING):    MEDICATIONS  (PRN):  gabapentin 300 milliGRAM(s) Oral four times a day PRN anxiety  haloperidol     Tablet 5 milliGRAM(s) Oral every 4 hours PRN agitation  haloperidol    Injectable 5 milliGRAM(s) IntraMuscular once PRN severe agitation  LORazepam     Tablet 2 milliGRAM(s) Oral every 4 hours PRN Agitation  LORazepam   Injectable 2 milliGRAM(s) IntraMuscular once PRN severe agitation  nicotine  Polacrilex Gum 4 milliGRAM(s) Oral every 3 hours PRN Smoking Cessation

## 2024-07-23 NOTE — ED BEHAVIORAL HEALTH PROGRESS NOTE - PSYCHIATRIC ISSUES AND PLAN (INCLUDE STANDING AND PRN MEDICATION)
increase Risperdal to 2 mg qhs; For agitation prns: PO/IM Haldol 5 mg q 4h, Ativan 2 mg q 4h
Start Risperdal 1 mg qhs; For agitation prns: PO/IM Haldol 5 mg q 4h, Ativan 2 mg q 4h

## 2024-07-23 NOTE — BH INPATIENT PSYCHIATRY ASSESSMENT NOTE - NSBHASSESSSUMMFT_PSY_ALL_CORE
Patient is a 44 year old male, single, domiciled in [place] with his mother, has a 20 y/o son, previously a  in [place], currently works making oils, unknown PPHx as patient denies previous diagnoses and hospitalizations, but may have a hx of schizophrenia per mother, not on medications, no prior suicide attempts, smokes cigars socially, denies other substances though Utox + for THC in the ED, denies PMHx, brought in by EMS, activated by patient's mother during an argument.  Pt presents with the delusion that a device is implanted in his head causing him to hear the voice of his cousin that at times predicts and controls his actions. Delusion is expanding to now include paranoia towards his cousin and mother, with whom he lives. Reports AH disrupts his sleep and has not gotten more than 4 hours/night in a long time. Pt has no insight into the need for treatment and denies all psychiatric history despite his mother's report that he has been diagnosed with schizophrenia. Pt requires psychiatric admission for safety and stabilization.     PLAN  9.39 admit  q15 adequate  Psych meds:  stop risperidone 2 qhs  start invega 3 qhs TUES and 6 qhs WED  PRNs  Family collateral as per ED note and other  G&M therapy  Supportive therapy as tolerated  DISPO TBD

## 2024-07-23 NOTE — ED BEHAVIORAL HEALTH PROGRESS NOTE - UNABLE TO CARE FOR SELF DETAILS
Inability to care for self 2/2 to untreated psychosis
Inability to care for self 2/2 to untreated psychosis

## 2024-07-23 NOTE — ED BEHAVIORAL HEALTH PROGRESS NOTE - RISK ASSESSMENT
At this time, patient presents with elevated acute risk of harm to self or others given active AH interfering with sleep, paranoid delusions towards family including his mother with whom he lives, ongoing cannabis use, and lack of insight into the need for treatment. He is not currently connected to any psychiatric care. He has several protective factors that help mitigate his risk, including denial of current SI/HI, no hx of SA/SIB, no access to weapons, engaged in work, spirituality, domiciled, and social support from mother. Currently, he meets criteria for involuntary psychiatric admission for safety and stabilization.
At this time, patient presents with elevated acute risk of harm to self or others given active AH interfering with sleep, paranoid delusions towards family including his mother with whom he lives, ongoing cannabis use, and lack of insight into the need for treatment. He is not currently connected to any psychiatric care. He has several protective factors that help mitigate his risk, including denial of current SI/HI, no hx of SA/SIB, no access to weapons, engaged in work, spirituality, domiciled, and social support from mother. Currently, he meets criteria for involuntary psychiatric admission for safety and stabilization.

## 2024-07-23 NOTE — ED BEHAVIORAL HEALTH NOTE - BEHAVIORAL HEALTH NOTE
writer emailed packet to [facility] for transfer. pt has no insurance. no beds at [facility].

## 2024-07-23 NOTE — BH INPATIENT PSYCHIATRY ASSESSMENT NOTE - NSBHCHARTREVIEWVS_PSY_A_CORE FT
Vital Signs Last 24 Hrs  T(C): 36.6 (07-23-24 @ 07:34), Max: 36.6 (07-23-24 @ 00:49)  T(F): 97.9 (07-23-24 @ 07:34), Max: 97.9 (07-23-24 @ 00:49)  HR: 63 (07-23-24 @ 07:34) (60 - 63)  BP: 108/61 (07-23-24 @ 07:34) (108/61 - 113/74)  BP(mean): --  RR: 16 (07-23-24 @ 07:34) (16 - 16)  SpO2: 99% (07-23-24 @ 07:34) (99% - 99%)    Orthostatic VS  07-23-24 @ 11:45  Lying BP: --/-- HR: --  Sitting BP: 126/70 HR: 65  Standing BP: 116/74 HR: 68  Site: --  Mode: --

## 2024-07-23 NOTE — BH SOCIAL WORK INITIAL PSYCHOSOCIAL EVALUATION - NSBHFINANCECOPAY_PSY_ALL_CORE
6/17/23  Came to see the patient however the consult has been cancelled in favor of telepsych.   Thank you  
Unknown

## 2024-07-24 LAB
A1C WITH ESTIMATED AVERAGE GLUCOSE RESULT: 5.1 % — SIGNIFICANT CHANGE UP (ref 4–5.6)
CHOLEST SERPL-MCNC: 154 MG/DL — SIGNIFICANT CHANGE UP
ESTIMATED AVERAGE GLUCOSE: 100 — SIGNIFICANT CHANGE UP
HDLC SERPL-MCNC: 52 MG/DL — SIGNIFICANT CHANGE UP
LIPID PNL WITH DIRECT LDL SERPL: 60 MG/DL — SIGNIFICANT CHANGE UP
NON HDL CHOLESTEROL: 102 MG/DL — SIGNIFICANT CHANGE UP
TRIGL SERPL-MCNC: 211 MG/DL — HIGH

## 2024-07-24 PROCEDURE — 99232 SBSQ HOSP IP/OBS MODERATE 35: CPT

## 2024-07-24 RX ORDER — LORAZEPAM 4 MG/ML
2 INJECTION INTRAMUSCULAR; INTRAVENOUS ONCE
Refills: 0 | Status: DISCONTINUED | OUTPATIENT
Start: 2024-07-24 | End: 2024-07-31

## 2024-07-24 RX ORDER — LORAZEPAM 4 MG/ML
2 INJECTION INTRAMUSCULAR; INTRAVENOUS EVERY 4 HOURS
Refills: 0 | Status: DISCONTINUED | OUTPATIENT
Start: 2024-07-24 | End: 2024-07-24

## 2024-07-24 RX ADMIN — PALIPERIDONE 6 MILLIGRAM(S): 3 TABLET, EXTENDED RELEASE ORAL at 20:44

## 2024-07-24 NOTE — PSYCHIATRIC REHAB INITIAL EVALUATION - NSBHPRRECOMMEND_PSY_ALL_CORE
No Writer met with patient to orient to unit and to introduce self and psychiatric rehabilitation staff and functions. When writer met with patient, he was in the dayroom. In session patient was fairly receptive and cooperative. Patient is a 44-year-old male domiciled in [place] with his mother. 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 Patient has unknown history of hospitalizations. Patient reported he was brought to the hospital due to argument with mother. Per chart patient believes there is something speaking in his ears and trying to control him. In session patient reported feeling fine at this time. Patient and writer collaboratively created psych rehab goal for patient to follow. Over the next few days Psych rehab staff will continue to engage and support patient throughout.

## 2024-07-25 PROCEDURE — 99232 SBSQ HOSP IP/OBS MODERATE 35: CPT

## 2024-07-25 RX ADMIN — PALIPERIDONE 6 MILLIGRAM(S): 3 TABLET, EXTENDED RELEASE ORAL at 20:36

## 2024-07-25 NOTE — BH INPATIENT PSYCHIATRY PROGRESS NOTE - OTHER
guarded and evasive but some rapport developing with his MD guarded and evasive pt narrative somatic delusions

## 2024-07-26 LAB
T3 SERPL-MCNC: 71 NG/DL — LOW (ref 80–200)
T4 AB SER-ACNC: 3.73 UG/DL — LOW (ref 5.1–13)
TSH SERPL-MCNC: 5.54 UIU/ML — HIGH (ref 0.27–4.2)

## 2024-07-26 RX ADMIN — PALIPERIDONE 6 MILLIGRAM(S): 3 TABLET, EXTENDED RELEASE ORAL at 20:18

## 2024-07-26 NOTE — BH INPATIENT PSYCHIATRY PROGRESS NOTE - OTHER
somatic delusions guarded and evasive pt narrative guarded and evasive but some rapport developing with his MD but not evident today, arguing and opposition insisting on discharge with no insight

## 2024-07-27 LAB — VIT D25+D1,25 OH+D1,25 PNL SERPL-MCNC: 31.3 PG/ML — SIGNIFICANT CHANGE UP (ref 19.9–79.3)

## 2024-07-27 PROCEDURE — 99232 SBSQ HOSP IP/OBS MODERATE 35: CPT

## 2024-07-27 RX ADMIN — Medication 5 MILLIGRAM(S): at 20:14

## 2024-07-27 RX ADMIN — PALIPERIDONE 6 MILLIGRAM(S): 3 TABLET, EXTENDED RELEASE ORAL at 20:13

## 2024-07-28 PROCEDURE — 99232 SBSQ HOSP IP/OBS MODERATE 35: CPT

## 2024-07-28 RX ADMIN — PALIPERIDONE 6 MILLIGRAM(S): 3 TABLET, EXTENDED RELEASE ORAL at 20:22

## 2024-07-29 PROCEDURE — 99232 SBSQ HOSP IP/OBS MODERATE 35: CPT

## 2024-07-29 RX ORDER — PALIPERIDONE 3 MG/1
9 TABLET, EXTENDED RELEASE ORAL AT BEDTIME
Refills: 0 | Status: DISCONTINUED | OUTPATIENT
Start: 2024-07-29 | End: 2024-08-01

## 2024-07-29 RX ORDER — OLANZAPINE 7.5 MG/1
10 TABLET ORAL ONCE
Refills: 0 | Status: DISCONTINUED | OUTPATIENT
Start: 2024-07-29 | End: 2024-08-20

## 2024-07-30 LAB — VIT B1 SERPL-MCNC: 118.9 NMOL/L — SIGNIFICANT CHANGE UP (ref 66.5–200)

## 2024-07-30 PROCEDURE — 99232 SBSQ HOSP IP/OBS MODERATE 35: CPT

## 2024-07-30 RX ADMIN — PALIPERIDONE 9 MILLIGRAM(S): 3 TABLET, EXTENDED RELEASE ORAL at 20:30

## 2024-07-30 NOTE — BH INPATIENT PSYCHIATRY PROGRESS NOTE - OTHER
guarded and evasive, paranoid themes continue, but less argumentative, reports he feels calmer with the medication denies but suspected

## 2024-07-31 PROCEDURE — 99232 SBSQ HOSP IP/OBS MODERATE 35: CPT

## 2024-07-31 NOTE — BH INPATIENT PSYCHIATRY PROGRESS NOTE - OTHER
guarded and evasive, paranoid themes continue, but less argumentative, reports he feels calmer with the medication prominently paranoid pt narrative denies but suspected

## 2024-08-01 PROCEDURE — 99232 SBSQ HOSP IP/OBS MODERATE 35: CPT

## 2024-08-01 RX ORDER — PALIPERIDONE 3 MG/1
9 TABLET, EXTENDED RELEASE ORAL DAILY
Refills: 0 | Status: DISCONTINUED | OUTPATIENT
Start: 2024-08-02 | End: 2024-08-20

## 2024-08-01 NOTE — PHARMACOTHERAPY INTERVENTION NOTE - COMMENTS
spoke to dr. girard that pt crcl is around 70 and 6mg/day max. He will reorder labs again and monitor

## 2024-08-01 NOTE — BH INPATIENT PSYCHIATRY PROGRESS NOTE - OTHER
guarded and evasive, paranoid themes continue, but less argumentative, no longer reports he feels calmer with the medication; seen on unit hiding pills on his pocket and pt was suspected to be cheeking medication prominently paranoid pt narrative denies but suspected

## 2024-08-02 PROCEDURE — 99232 SBSQ HOSP IP/OBS MODERATE 35: CPT

## 2024-08-02 PROCEDURE — 99222 1ST HOSP IP/OBS MODERATE 55: CPT

## 2024-08-02 RX ADMIN — PALIPERIDONE 9 MILLIGRAM(S): 3 TABLET, EXTENDED RELEASE ORAL at 08:26

## 2024-08-02 NOTE — BH INPATIENT PSYCHIATRY PROGRESS NOTE - OTHER
denies but suspected guarded and evasive, paranoid themes continue, but less argumentative, no longer reports he feels calmer with the medication; challenged MD assertion that he was seen on unit hiding pills on his pocket and pt was suspected to be cheeking medication; tolerated some reality testing today fair at times vs frankly paranoid pt narrative prominently paranoid

## 2024-08-02 NOTE — CONSULT NOTE ADULT - ASSESSMENT
44 M w no known PMHx currently at [facility] for auditory hallucinations, asked to see for abnormal TFTs     # abnormal TFTs  - mildly elevated TSH and suppressed T4  - pld repeat TSH and check free T4 - if confirmatory for hypothyroidism then can initiate synthroid weight based  - defer possible psychotropics effects op TFTs to psych    # renal insuffiencey  - cr borderline elevated  - repeat BMP for now  - avoid nephrotoxics     # auditory hallucination  - per primary team  - monitor qtc on psychotropics    44 M w no known PMHx currently at [facility] for auditory hallucinations, asked to see for abnormal TFTs     # abnormal TFTs  - mildly elevated TSH and suppressed T4  - pld repeat TSH and check free T4 - if confirmatory for hypothyroidism then can initiate synthroid weight based  - defer possible psychotropics effects on TFTs to psych    # renal insuffiencey  - cr borderline elevated  - repeat BMP for now  - avoid nephrotoxics     # auditory hallucination  - per primary team  - monitor qtc on psychotropics

## 2024-08-03 RX ADMIN — PALIPERIDONE 9 MILLIGRAM(S): 3 TABLET, EXTENDED RELEASE ORAL at 09:28

## 2024-08-04 RX ADMIN — PALIPERIDONE 9 MILLIGRAM(S): 3 TABLET, EXTENDED RELEASE ORAL at 08:50

## 2024-08-05 LAB
ALBUMIN SERPL ELPH-MCNC: 4 G/DL — SIGNIFICANT CHANGE UP (ref 3.3–5)
ALP SERPL-CCNC: 62 U/L — SIGNIFICANT CHANGE UP (ref 40–120)
ALT FLD-CCNC: 26 U/L — SIGNIFICANT CHANGE UP (ref 4–41)
ANION GAP SERPL CALC-SCNC: 11 MMOL/L — SIGNIFICANT CHANGE UP (ref 7–14)
AST SERPL-CCNC: 27 U/L — SIGNIFICANT CHANGE UP (ref 4–40)
BILIRUB SERPL-MCNC: 0.3 MG/DL — SIGNIFICANT CHANGE UP (ref 0.2–1.2)
BUN SERPL-MCNC: 22 MG/DL — SIGNIFICANT CHANGE UP (ref 7–23)
CALCIUM SERPL-MCNC: 8.9 MG/DL — SIGNIFICANT CHANGE UP (ref 8.4–10.5)
CHLORIDE SERPL-SCNC: 101 MMOL/L — SIGNIFICANT CHANGE UP (ref 98–107)
CO2 SERPL-SCNC: 27 MMOL/L — SIGNIFICANT CHANGE UP (ref 22–31)
CREAT SERPL-MCNC: 1.34 MG/DL — HIGH (ref 0.5–1.3)
EGFR: 67 ML/MIN/1.73M2 — SIGNIFICANT CHANGE UP
GLUCOSE SERPL-MCNC: 93 MG/DL — SIGNIFICANT CHANGE UP (ref 70–99)
MAGNESIUM SERPL-MCNC: 2 MG/DL — SIGNIFICANT CHANGE UP (ref 1.6–2.6)
POTASSIUM SERPL-MCNC: 4.2 MMOL/L — SIGNIFICANT CHANGE UP (ref 3.5–5.3)
POTASSIUM SERPL-SCNC: 4.2 MMOL/L — SIGNIFICANT CHANGE UP (ref 3.5–5.3)
PROT SERPL-MCNC: 6.7 G/DL — SIGNIFICANT CHANGE UP (ref 6–8.3)
SODIUM SERPL-SCNC: 139 MMOL/L — SIGNIFICANT CHANGE UP (ref 135–145)
T3 SERPL-MCNC: 81 NG/DL — SIGNIFICANT CHANGE UP (ref 80–200)
T4 AB SER-ACNC: 4.04 UG/DL — LOW (ref 5.1–13)
TSH SERPL-MCNC: 4.24 UIU/ML — HIGH (ref 0.27–4.2)

## 2024-08-05 PROCEDURE — 99232 SBSQ HOSP IP/OBS MODERATE 35: CPT

## 2024-08-05 RX ORDER — PALIPERIDONE 3 MG/1
234 TABLET, EXTENDED RELEASE ORAL ONCE
Refills: 0 | Status: DISCONTINUED | OUTPATIENT
Start: 2024-08-06 | End: 2024-08-06

## 2024-08-05 RX ADMIN — PALIPERIDONE 9 MILLIGRAM(S): 3 TABLET, EXTENDED RELEASE ORAL at 08:38

## 2024-08-05 NOTE — BH INPATIENT PSYCHIATRY PROGRESS NOTE - OTHER
prominently paranoid denies but suspected pt narrative fair at times vs frankly paranoid guarded and evasive, paranoid themes continue, but less argumentative, no longer reports he feels calmer with the medication; says he is taking meds, better rapport with md and calmer, tolerated some reality testing today

## 2024-08-06 PROCEDURE — 99231 SBSQ HOSP IP/OBS SF/LOW 25: CPT

## 2024-08-06 RX ORDER — PALIPERIDONE 3 MG/1
156 TABLET, EXTENDED RELEASE ORAL ONCE
Refills: 0 | Status: COMPLETED | OUTPATIENT
Start: 2024-08-07 | End: 2024-08-15

## 2024-08-06 RX ADMIN — PALIPERIDONE 9 MILLIGRAM(S): 3 TABLET, EXTENDED RELEASE ORAL at 10:05

## 2024-08-06 NOTE — BH INPATIENT PSYCHIATRY PROGRESS NOTE - OTHER
prominently paranoid fair at times vs frankly paranoid now less guarded and evasive, paranoia attenuating and less argumentative, reports he feels calmer with the medication; says he is taking meds, better rapport with md and calmer, tolerated some reality testing today and mention of abnormal thyroid labs denies but suspected pt narrative

## 2024-08-07 PROCEDURE — 99232 SBSQ HOSP IP/OBS MODERATE 35: CPT

## 2024-08-07 RX ORDER — LEVOTHYROXINE SODIUM 100 MCG
88 TABLET ORAL DAILY
Refills: 0 | Status: DISCONTINUED | OUTPATIENT
Start: 2024-08-08 | End: 2024-08-12

## 2024-08-07 NOTE — BH INPATIENT PSYCHIATRY PROGRESS NOTE - OTHER
now less guarded and evasive, paranoia attenuating and less argumentative, reports he feels calmer with the medication; says he is taking meds, better rapport with md and calmer, tolerated some reality testing today and mention of abnormal thyroid labs denies but suspected pt narrative prominently paranoid fair at times vs frankly paranoid, attenuating

## 2024-08-08 PROCEDURE — 99232 SBSQ HOSP IP/OBS MODERATE 35: CPT

## 2024-08-08 RX ADMIN — Medication 88 MICROGRAM(S): at 06:37

## 2024-08-08 NOTE — BH INPATIENT PSYCHIATRY PROGRESS NOTE - OTHER
denies but suspected pt narrative now less guarded and evasive, paranoia attenuating and less argumentative, reports he feels calmer with the medication; says he is taking meds, better rapport with md and calmer, tolerated some reality testing today and mention of abnormal thyroid labs prominently paranoid fair at times vs frankly paranoid, attenuating

## 2024-08-09 PROBLEM — Z78.9 OTHER SPECIFIED HEALTH STATUS: Chronic | Status: ACTIVE | Noted: 2024-07-21

## 2024-08-09 LAB — VIT B1 SERPL-MCNC: 105.9 NMOL/L — SIGNIFICANT CHANGE UP (ref 66.5–200)

## 2024-08-09 PROCEDURE — 99232 SBSQ HOSP IP/OBS MODERATE 35: CPT

## 2024-08-09 RX ADMIN — Medication 88 MICROGRAM(S): at 08:13

## 2024-08-09 RX ADMIN — PALIPERIDONE 9 MILLIGRAM(S): 3 TABLET, EXTENDED RELEASE ORAL at 08:13

## 2024-08-09 NOTE — BH INPATIENT PSYCHIATRY PROGRESS NOTE - OTHER
now less guarded and evasive, paranoia attenuating and less argumentative, reports he feels calmer with the medication; says he is taking meds, better rapport with md and calmer, tolerated some reality testing today but appears suspicious of ANDERSON and declines at present denies but suspected fair at times vs frankly paranoid, attenuating less prominently paranoid pt narrative

## 2024-08-10 RX ADMIN — PALIPERIDONE 9 MILLIGRAM(S): 3 TABLET, EXTENDED RELEASE ORAL at 08:43

## 2024-08-10 RX ADMIN — Medication 88 MICROGRAM(S): at 05:55

## 2024-08-11 RX ADMIN — PALIPERIDONE 9 MILLIGRAM(S): 3 TABLET, EXTENDED RELEASE ORAL at 08:34

## 2024-08-11 RX ADMIN — Medication 88 MICROGRAM(S): at 06:32

## 2024-08-12 LAB
9-OH-RISPERIDONE: <0.5 NG/ML — SIGNIFICANT CHANGE UP
RISPERIDONE+9OH-RIS SERPL-MCNC: <0.5 NG/ML — SIGNIFICANT CHANGE UP
TOTAL (RISP+9-OH): <1 NG/ML — LOW (ref 10–120)

## 2024-08-12 PROCEDURE — 99232 SBSQ HOSP IP/OBS MODERATE 35: CPT

## 2024-08-12 RX ORDER — LEVOTHYROXINE SODIUM 100 MCG
88 TABLET ORAL AT BEDTIME
Refills: 0 | Status: DISCONTINUED | OUTPATIENT
Start: 2024-08-13 | End: 2024-08-20

## 2024-08-12 RX ADMIN — Medication 5 MILLIGRAM(S): at 21:09

## 2024-08-12 RX ADMIN — Medication 300 MILLIGRAM(S): at 21:09

## 2024-08-12 RX ADMIN — Medication 88 MICROGRAM(S): at 05:50

## 2024-08-12 RX ADMIN — PALIPERIDONE 9 MILLIGRAM(S): 3 TABLET, EXTENDED RELEASE ORAL at 08:20

## 2024-08-12 NOTE — BH INPATIENT PSYCHIATRY PROGRESS NOTE - OTHER
pt narrative now less guarded and evasive, paranoia attenuating and less argumentative, reports he feels calmer with the medication; says he is taking meds, better rapport with md and calmer, better tolerated reality testing today but appears suspicious of ANDERSON and declines option less prominently paranoid; asks how he can hear voice of his cousin in his head if not real/reality testing on this subject fair at times vs frankly paranoid, attenuating

## 2024-08-13 PROCEDURE — 99233 SBSQ HOSP IP/OBS HIGH 50: CPT

## 2024-08-13 RX ORDER — LEVOTHYROXINE SODIUM 100 MCG
1 TABLET ORAL
Qty: 30 | Refills: 0
Start: 2024-08-13 | End: 2024-09-11

## 2024-08-13 RX ORDER — PALIPERIDONE 3 MG/1
1 TABLET, EXTENDED RELEASE ORAL
Qty: 30 | Refills: 0
Start: 2024-08-13 | End: 2024-09-11

## 2024-08-13 RX ADMIN — Medication 300 MILLIGRAM(S): at 20:15

## 2024-08-13 RX ADMIN — Medication 88 MICROGRAM(S): at 20:15

## 2024-08-13 RX ADMIN — PALIPERIDONE 9 MILLIGRAM(S): 3 TABLET, EXTENDED RELEASE ORAL at 08:29

## 2024-08-13 NOTE — BH INPATIENT PSYCHIATRY PROGRESS NOTE - OTHER
now less guarded and evasive, paranoia attenuating and less argumentative, reports he feels calmer with the medication; says he is taking meds, better rapport with md and calmer, better tolerated reality testing but appears suspicious of ANDERSON and declines option and ambivalent about patching up relationship with mother fair at times vs frankly paranoid, attenuating less prominently paranoid; asks how he can hear voice of his cousin in his head if not real/reality testing on this subject pt narrative

## 2024-08-14 PROCEDURE — 99233 SBSQ HOSP IP/OBS HIGH 50: CPT

## 2024-08-14 RX ADMIN — Medication 88 MICROGRAM(S): at 20:05

## 2024-08-14 RX ADMIN — PALIPERIDONE 9 MILLIGRAM(S): 3 TABLET, EXTENDED RELEASE ORAL at 09:07

## 2024-08-14 NOTE — BH INPATIENT PSYCHIATRY DISCHARGE NOTE - ADDITIONAL DETAILS / COMMENTS
both improved and fair on some topics both improved and fair on some topics, agrees to ANDERSON and phone meeting with mother

## 2024-08-14 NOTE — BH INPATIENT PSYCHIATRY DISCHARGE NOTE - OTHER PAST PSYCHIATRIC HISTORY (INCLUDE DETAILS REGARDING ONSET, COURSE OF ILLNESS, INPATIENT/OUTPATIENT TREATMENT)
Pt denies PPHx including previous diagnoses, hospitalizations, and medications. However, per mother, may have been diagnosed with schizophrenia in [place].

## 2024-08-14 NOTE — BH INPATIENT PSYCHIATRY DISCHARGE NOTE - NSBHDCMEDICALFT_PSY_A_CORE
Psychosis  Elevated mood  Agitation  Anxiety  Cannabinoid use disorder  R/o personality disorder in adult/cluster b  Hypothyroidism  Elevated Cr/Decreased GFR

## 2024-08-14 NOTE — BH TREATMENT PLAN - NSDCCRITERIA_PSY_ALL_CORE
cgi<=2  ANDERSON for safety and compliance with no psychosis and aftercare, ideally with insight
cgi<=2  ANDERSON for safety and compliance
cgi<=2  ANDERSON for safety and compliance
cgi<=2  ANDERSON for safety and compliance with no psychosis

## 2024-08-14 NOTE — BH INPATIENT PSYCHIATRY DISCHARGE NOTE - NSDCCPCAREPLAN_GEN_ALL_CORE_FT
PRINCIPAL DISCHARGE DIAGNOSIS  Diagnosis: Severe bipolar disorder with psychotic features, mood-congruent  Assessment and Plan of Treatment:       SECONDARY DISCHARGE DIAGNOSES  Diagnosis: Cannabis use disorder  Assessment and Plan of Treatment:

## 2024-08-14 NOTE — BH TREATMENT PLAN - NSCMSPTSTRENGTHS_PSY_ALL_CORE
Intact employment/Interpersonal skills/Physically healthy/Supportive family
Assertive/Courageous/Intact employment/Interpersonal skills/Leisure interest/Physically healthy/Resourceful/Supportive family
Assertive/Intact employment/Interpersonal skills/Physically healthy/Resourceful/Supportive family
Assertive/Courageous/Intact employment/Interpersonal skills/Leisure interest/Physically healthy/Resourceful/Supportive family

## 2024-08-14 NOTE — BH INPATIENT PSYCHIATRY DISCHARGE NOTE - NSBHDCHANDOFFFT_PSY_ALL_CORE
writer to signout case via phone Appt: Pt on ANDERSON. [facility] Intake; [phone] [id number] [place] [facility].  Norbert Gómez, also spoke with [employer]. FAX: [phone] for DC summary.

## 2024-08-14 NOTE — BH TREATMENT PLAN - NSTXSUPORTINTERMD_PSY_ALL_CORE
psychopharm with goal of an ANDERSON and MI for all substance use cessation and supportive therapy with new o/p providers

## 2024-08-14 NOTE — BH INPATIENT PSYCHIATRY DISCHARGE NOTE - ATTENDING DISCHARGE PHYSICAL EXAMINATION:
Stable for DC to home of mother  Rx meds filled  ANDERSON loaded  Denies all SHIIP and AVTH  Phone meeting with pt and mother yesterday, case and meds reviewed.

## 2024-08-14 NOTE — BH DISCHARGE NOTE NURSING/SOCIAL WORK/PSYCH REHAB - NSCDUDCCRISIS_PSY_A_CORE
[facility]  [phone] [facility]LL ([phone])  Text "WELL" to [phone]  Website: [email or web address] 1 [phone] 891-KDIX (9845) Website: www.safehorizon.org/.National Suicide Prevention Lifeline 5 (399) 634-1357/.  [facility]  1 (507) LIFENET ([phone])/.  [facility]  [address] [place] [phone]   Hours:  Monday through Friday from 9 AM to 3 PM

## 2024-08-14 NOTE — BH INPATIENT PSYCHIATRY DISCHARGE NOTE - NSBHASSESSSUMMFT_PSY_ALL_CORE
Patient is a 44 year old male, single, domiciled in [place] with his mother, has a 20 y/o son, previously a  in [place], currently works making oils, unknown PPHx as patient denies previous diagnoses and hospitalizations, but may have a hx of schizophrenia per mother, not on medications, no prior suicide attempts, smokes cigars socially, denies other substances though Utox + for THC in the ED, denies PMHx, brought in by EMS, activated by patient's mother during an argument.  Pt presents with the delusion that a device is implanted in his head causing him to hear the voice of his cousin that at times predicts and controls his actions. Delusion is expanding to now include paranoia towards his cousin and mother, with whom he lives. Reports AH disrupts his sleep and has not gotten more than 4 hours/night in a long time. Pt has no insight into the need for treatment and denies all psychiatric history despite his mother's report that he has been diagnosed with schizophrenia. Pt requires psychiatric admission for safety and stabilization. Reluctant to trial meds but now shows good response to invega PO, declines ANDERSON option for new dx of BAD+P and hypothyroidism, now on synthroid.    PLAN  9.39 admit  q15 adequate  Psych meds:  stop risperidone 2 qhs  start invega 3 qhs TUES and 6 qhs WED and attempt 9 qhs MON 7/29-- questionable compliance/?cheeking as pt remains paranoid;  rescheduled as qd for better monitoring with mouth checks  ANDERSON option for safety and compliance SUSTENNA  Consider addition of lithium, TBD  PRNs  Family collateral as per ED note and other  G&M therapy  Supportive therapy as tolerated  MOO likely needed, will initiate TUES 8/6  DISPO TBD Patient is a 44 year old male, single, domiciled in [place] with his mother, has a 20 y/o son, previously a  in [place], currently works making oils, unknown PPHx as patient denies previous diagnoses and hospitalizations, but may have a hx of schizophrenia per mother, not on medications, no prior suicide attempts, smokes cigars socially, denies other substances though Utox + for THC in the ED, denies PMHx, brought in by EMS, activated by patient's mother during an argument.  Pt presents with the delusion that a device is implanted in his head causing him to hear the voice of his cousin that at times predicts and controls his actions. Delusion is expanding to now include paranoia towards his cousin and mother, with whom he lives. Reports AH disrupts his sleep and has not gotten more than 4 hours/night in a long time. Pt has no insight into the need for treatment and denies all psychiatric history despite his mother's report that he has been diagnosed with schizophrenia. Pt requires psychiatric admission for safety and stabilization. Reluctant to trial meds but now shows good response to invega PO, declined initially ANDERSON option but now does agree, new dx of BAD+P and hypothyroidism, now on synthroid and cannabinoid use disorder. R/o SAD, BAD type and SIMD.    PLAN  9.39 admit  q15 adequate  Psych meds:  stop risperidone 2 qhs  start invega 3 qhs TUES and 6 qhs WED and attempt 9 qhs MON 7/29-- questionable compliance/?cheeking as pt remains paranoid;  rescheduled as qd for better monitoring with mouth checks  ANDERSON option for safety and compliance SUSTENNA loaded at 156/117 d/t increase in Cr/decrease in GFR  Consider addition of lithium, TBD  PRNs  Family collateral as per ED note and other  G&M therapy  Supportive therapy as tolerated  MOO not needed  DISPO o/p clinic f/u on ANDERSON

## 2024-08-14 NOTE — BH DISCHARGE NOTE NURSING/SOCIAL WORK/PSYCH REHAB - NSTOBACCONEVERSMOKERY/N_GEN_A
Last seen 1/21/2021 by Dr Roberto Del Toro, oncology on 3/10/2022    Patient now lives in [facility], any recommendations for PCP closer to home Yes

## 2024-08-14 NOTE — BH TREATMENT PLAN - NSTXSUBMISGOAL_PSY_ALL_CORE
Will verbalize 3 adverse consequences of use

## 2024-08-14 NOTE — BH SAFETY PLAN - INTERNAL COPING STRATEGY 2
Patient Information     Patient Name MRN Sex Christiano Mccarthy [id number] Male 1969      Progress Notes by Jenna Kothari NP at 3/22/2017  5:00 PM     Author:  Jenna Kothari NP Service:  (none) Author Type:  PHYS- Nurse Practitioner     Filed:  3/22/2017  5:34 PM Encounter Date:  3/22/2017 Status:  Signed     :  Jenna Kothari NP (PHYS- Nurse Practitioner)            HPI:    Christiano Metcalf is a 48 y.o. male who presents to clinic today for flu like symptoms.   Symptoms started this morning.  Symptoms include chills, fevers, severe sore throat, congested non productive cough, chest congestion, stuffy nose, mild headache, body aches, fatigue, and decreased appetite.  No vomiting.  No solids today.  Using Albuterol inhaler twice yesterday, none today.  No Ibuprofen or Naproxen.  No flu shot.          Past Medical History      Diagnosis   Date     Gout       History of torn meniscus of right knee       repair      Hypomagnesemia       Knee pain, left       Thumb injury       Past Surgical History       Procedure   Laterality Date     Hand finger surgery        Right thumb ORIF        Knee arthroscopy        Right knee meniscal repair, arthroscopic ally       Knee arthroscopy        Left knee scope        Hernia repair  Left 7/15/14     LIH with mesh       Hernia repair  Right 5/10/16     RIH with plug/patch       Social History       Substance Use Topics         Smoking status:   Former Smoker     Types:  Cigarettes     Quit date:  2010     Smokeless tobacco:   Never Used     Alcohol use   No      Comment: seldom      Current Outpatient Prescriptions       Medication  Sig Dispense Refill     albuterol HFA (PROAIR HFA) 90 mcg/actuation inhaler Inhale 2 Puffs by mouth 4 times daily if needed. 1 Inhaler 11     cetirizine (ZYRTEC) 10 mg tablet Take 1 tablet by mouth once daily. 90 tablet 3     ibuprofen (ADVIL; MOTRIN) 200 mg tablet Take 200 mg by mouth 4 times daily if  "needed (took 3 pills at a time).       indomethacin (INDOCIN) 50 mg capsule Take 1 capsule by mouth 3 times daily with meals. 30 capsule 3     naproxen (NAPROSYN) 500 mg tablet TAKE 1 TABLET BY MOUTH 2 TIMES DAILY WITH MEALS. 60 tablet 2     No current facility-administered medications for this visit.      Medications have been reviewed by me and are current to the best of my knowledge and ability.    Allergies      Allergen   Reactions     Other [Unlisted Allergen (Include Detail In Comments)]  Nausea And Vomiting     Peas        ROS:  Refer to HPI    Visit Vitals       /80     Pulse 94     Temp 99.8  F (37.7  C) (Tympanic)     Ht 1.74 m (5' 8.5\")     Wt 95.7 kg (211 lb)     BMI 31.61 kg/m2       EXAM:  General Appearance:  Miserable and fatigued appearing adult male, appropriate appearance for age. No acute distress  Head: normocephalic, atraumatic  Ears: Left TM with bony landmarks appreciated, no erythema, no effusion, no bulging, no purulence.  Right TM with bony landmarks appreciated, no erythema, no effusion, no bulging, no purulence.   Left auditory canal clear.  Right auditory canal clear.  Normal external ears, non tender.  Eyes: conjunctivae normal, no drainage  Orophayrnx: moist mucous membranes, posterior pharynx with erythema, tonsils without hypertrophy, erythema, no exudates or petechiae, no post nasal drip seen.    Neck: supple without adenopathy  Respiratory: normal chest wall and respirations.  Normal effort.  Clear to auscultation bilaterally, no wheezes or rhonchi or congestion, no cough appreciated  Cardiac: RRR with no murmurs  Psychological: normal affect, alert and pleasant      Labs:  Results for orders placed or performed in visit on 03/22/17      THROAT RAPID STREP A WITH REFLEX      Result  Value Ref Range    STREP A ANTIGEN           Negative Negative         ASSESSMENT/PLAN:    ICD-10-CM    1. Sore throat J02.9 THROAT RAPID STREP A WITH REFLEX      THROAT RAPID STREP A WITH " REFLEX   2. Influenza-like illness R69 oseltamivir (TAMIFLU) 75 mg capsule   3. Influenza-like symptoms R68.89 oseltamivir (TAMIFLU) 75 mg capsule   4. Fever and chills R50.9 oseltamivir (TAMIFLU) 75 mg capsule         Negative rapid strep test  Likely influenza   Tamiflu 75 mg BID x 5 days   Encouraged fluids  Symptomatic treatment - salt water gargles, honey, elevation, humidifier, sinus rinse/netti pot, lozenges, etc   Tylenol or ibuprofen PRN  Follow up if symptoms persist or worsen or concerns          Patient Instructions   Tamiflu twice daily x 5 days for influenza    Negative strep test         Index Hebrew All languages Related topics   Flu (Influenza)   ________________________________________________________________________  KEY POINTS    Flu is caused by a virus, and can be spread by coughing or sneezing, or by touching something with the virus on it.    The flu vaccine is the best way to help prevent the flu. Washing your hands often is also important. Flu can be treated rest, drinking plenty of liquids, and sometimes with medicine.    If you have another medical problem and get the flu, it s best to see your healthcare provider.  ________________________________________________________________________  What is flu?   Influenza, also called the flu, is an infection caused by a virus. The flu affects your whole body, especially your air passages, and causes symptoms that are similar to cold symptoms. Flu symptoms tend to be worse than cold symptoms, but it can sometimes be hard to tell the difference between the flu and a cold unless you get a test.  Infection with the flu virus sometimes leads to other infections, such as ear, sinus, and chest infections. Pneumonia can also occur as a result of the flu. It can be caused by the flu virus itself or by bacteria infecting lung tissues that have been damaged by the virus. Older adults; people whose immune systems are weak; and people with chronic medical  problems, such as heart or lung disease or diabetes, are at risk for more severe symptoms or problems. This is why it s important to try to prevent flu by getting flu shots every year.  What is the cause?  Flu is caused by a virus. When you have the flu, the virus is in your mucus and saliva and can spread to others when you cough or sneeze. People can also get the flu if they touch something with the flu virus on it (like cups, doorknobs, and hands) and then touch their mouth, nose, or eyes.  Outbreaks of flu occur every year, usually in late fall and winter.  What are the symptoms?  Flu tends to start suddenly. You may feel fine one hour and feel sick the next. Flu symptoms may be different from person to person. Some of the common symptoms include:    Chills, sweating, and fever    Cough    Runny or stuffy nose    Headache    Muscle or body aches    Sore throat    Tiredness  Flu symptoms usually last 3 to 7 days. You may start feeling better after the first 2 days or so.  How is it diagnosed?  Your healthcare provider will ask about your symptoms and may examine you. The diagnosis is usually based on your symptoms. There are lab tests for flu, but in most cases there is no need to do a test, especially when many others in your community are sick with the flu.  How is it treated?  Usually you can treat your symptoms at home.    Get plenty of rest.    Drink a lot of clear liquids. Water, broth, juice, electrolyte solutions, and noncaffeinated drinks are best. When you have a high fever, your body needs more liquid because you lose more water in your breath and from your skin. Having enough fluids also helps the mucus in your sinuses and lungs stay thin and easy to clear from the body. When the mucus is thin, it is less likely to cause a sinus or chest infection.    Consider taking acetaminophen or ibuprofen to relieve headaches and muscle aches and to lower a fever. Read the label and take as directed. Unless  recommended by your healthcare provider, you should not take these medicines for more than 10 days.    Nonsteroidal anti-inflammatory medicines (NSAIDs), such as ibuprofen, naproxen, and aspirin, may cause stomach bleeding and other problems. These risks increase with age.    Acetaminophen may cause liver damage or other problems. Unless recommended by your provider, don't take more than 3000 milligrams (mg) in 24 hours. To make sure you don t take too much, check other medicines you take to see if they also contain acetaminophen. Ask your provider if you need to avoid drinking alcohol while taking this medicine.    If your nose or sinuses get congested, a decongestant medicine may help you feel better. Taking a decongestant may help prevent ear or sinus infections.    Cough medicine or cough drops may temporarily help control a cough.  Antiviral medicine is medicine your healthcare provider can prescribe that may make flu symptoms less severe. It may also help you feel better a little sooner. The medicine can be taken as a tablet ornasal spray. It helps only if you start taking it within the first 2 days of illness. Usually it is taken for only a few days. Even if you are taking antiviral medicine, you can pass the flu virus to other people. It is still important to wash your hands often and cover your mouth and nose when you cough or sneeze.  Your healthcare provider may prescribe antiviral medicine if you aren t sick yet but have been exposed to the flu and have not had the flu vaccine.  Talk to your healthcare provider if you have symptoms of the flu and:    You have heart disease, asthma, chronic bronchitis, kidney disease, diabetes, or another chronic medical problem.    Your immune system does not work normally (for example, because you are taking steroid medicine for a medical problem).    Your symptoms get more severe, you have a painful cough, you are coughing up mucus, or you are having trouble breathing.  These symptoms can be signs of pneumonia.  Ask your healthcare provider:    How and when you will get your test results    How long it will take to recover from this illness    If there are activities you should avoid, and when you can return to your normal activities    How to take care of yourself at home    What symptoms or problems you should watch for and what to do if you have them  Make sure you know when you should come back for a checkup. Keep all appointments for provider visits or tests.  How can I help prevent flu?  The flu vaccine is the best way to help prevent the flu. If you do get the flu, the vaccine may help keep you from getting really sick. The flu vaccine is recommended for adults and children 6 months and older. It s especially important for those with a chronic illness.  The flu vaccine can be given as a nasal spray or as a shot in the arm. The nasal spray is not recommended for the 9378-5427 flu season because it has not prevented the disease for the last 3 years.    The shot contains killed virus and is safe for everyone age 6 months and older.  You should get a new flu shot every year because the vaccine wears off over time and because it is changed each year to protect against the current year s most likely flu strains. It s best to get the new vaccine as soon as it s available each year, before the start of flu season. However, if the vaccine is still available, it can be helpful to get it anytime during the flu season. Flu season usually starts in October and can last through May.  Flu seasons can vary from region to region. If you are at high risk for infection and plan to travel to an area where you might be exposed to the flu, make sure you have an up-to-date flu shot before you go on your trip.  Other things you can do to help avoid getting the flu are:    Wash your hands often with soap and water. Wash for 20 seconds (long enough to sing the whole  Happy Birthday  song) or use an  alcohol-based hand .    Avoid touching your eyes, nose, or mouth when you are out in public.    Stay at least 6 feet away from people who are sick, if you can.    Try to take good care of yourself: Get plenty of sleep, be physically active, manage your stress, drink plenty of fluids, and eat healthy food. Stop smoking.    Keep surfaces clean--especially bedside tables, surfaces in the bathroom, and toys for children. Some viruses and bacteria can live 2 hours or more on surfaces like cafeteria tables, doorknobs, and desks. Wipe them down with a household disinfectant according to directions on the label.  If you are sick, you can help protect others if you:    Don t go to work or school. Avoid contact with other people except to get medical care.    Cover your nose and mouth with a tissue when you cough or sneeze. Throw the tissue in the trash after you use it, and then wash your hands. If you don t have a tissue, cough or sneeze into your upper sleeve instead of your hands.    Clean your hands often with soap and water or an alcohol-based hand , especially after using tissues or coughing or sneezing into your hands.  Developed by [employer].  Adult Advisor 2016.3 published by [employer].  Last modified: 2016-06-29  Last reviewed: 2014-10-30  This content is reviewed periodically and is subject to change as new health information becomes available. The information is intended to inform and educate and is not a replacement for medical evaluation, advice, diagnosis or treatment by a healthcare professional.  References   Adult Advisor 2016.3 Index    Copyright   2016 [employer], a division of [employer] All rights reserved.                  Exercise

## 2024-08-14 NOTE — BH INPATIENT PSYCHIATRY PROGRESS NOTE - OTHER
now less guarded and evasive, paranoia attenuating and less argumentative, reports he feels calmer with the medication; says he is taking meds, better rapport with md and calmer, better tolerated reality testing but appears suspicious of ANDERSON and declines option and ambivalent about patching up relationship with mother but stable for DC to home, denies all SHIIP slightly more affect evident pt narrative

## 2024-08-14 NOTE — BH TREATMENT PLAN - NSTXCAREGIVERPARTICIPATE_PSY_P_CORE
Family/Caregiver participated in identification of needs/problems/goals for treatment/Family/Caregiver participated in defining interventions/No, patient unwilling to involve family/caregiver
No, patient unwilling to involve family/caregiver
Family/Caregiver participated in identification of needs/problems/goals for treatment
No, patient unwilling to involve family/caregiver

## 2024-08-14 NOTE — BH DISCHARGE NOTE NURSING/SOCIAL WORK/PSYCH REHAB - PATIENT PORTAL LINK FT
You can access the FollowMyHealth Patient Portal offered by  by registering at the following website: [email or web address]. By joining [employer]’s FollowMyHealth portal, you will also be able to view your health information using other applications (apps) compatible with our system.

## 2024-08-14 NOTE — BH TREATMENT PLAN - NSTXCAREGIVERAGREEMENT_PSY_P_CORE
Yes
Yes
Patient does not have family/caregiver involved in care
Patient does not have family/caregiver involved in care

## 2024-08-14 NOTE — BH TREATMENT PLAN - NSTXDCOPLKINTERSW_PSY_ALL_CORE
SW will make appropriate OP MH referrals to increase likelihood of psychiatric stability once back in the community
SW will make appropriate OP MH referrals to increase likelihood of psychiatric stability once back in the community.
SW will make appropriate OP MH referrals to increase likelihood of psychiatric stability in the community.
SW will make appropriate OP MH referrals to increase likelihood of psychiatric stability once back in the community

## 2024-08-14 NOTE — BH INPATIENT PSYCHIATRY DISCHARGE NOTE - NSBHMETABOLIC_PSY_ALL_CORE_FT
BMI: BMI (kg/m2): 23 (07-23-24 @ 11:45)  HbA1c: A1C with Estimated Average Glucose Result: 5.1 % (07-24-24 @ 09:00)    Glucose:   BP: 127/78 (08-12-24 @ 07:51) (127/78 - 127/78)Vital Signs Last 24 Hrs  T(C): 36.9 (08-14-24 @ 07:35), Max: 36.9 (08-14-24 @ 07:35)  T(F): 98.4 (08-14-24 @ 07:35), Max: 98.4 (08-14-24 @ 07:35)  HR: --  BP: --  BP(mean): --  RR: --  SpO2: --    Orthostatic VS  08-14-24 @ 07:35  Lying BP: --/-- HR: --  Sitting BP: 124/70 HR: 81  Standing BP: --/-- HR: --  Site: --  Mode: --    Lipid Panel: Date/Time: 07-24-24 @ 09:00  Cholesterol, Serum: 154  LDL Cholesterol Calculated: 60  HDL Cholesterol, Serum: 52  Total Cholesterol/HDL Ration Measurement: --  Triglycerides, Serum: 211

## 2024-08-14 NOTE — BH TREATMENT PLAN - NSBHPRIMARYDX_PSY_ALL_CORE
Psychosis, unspecified psychosis type    

## 2024-08-14 NOTE — BH DISCHARGE NOTE NURSING/SOCIAL WORK/PSYCH REHAB - DISCHARGE INSTRUCTIONS AFTERCARE APPOINTMENTS
In order to check the location, date, or time of your aftercare appointment, please refer to your Discharge Instructions Document given to you upon leaving the hospital.  If you have lost the instructions please call [phone]

## 2024-08-14 NOTE — BH INPATIENT PSYCHIATRY DISCHARGE NOTE - OTHER
pt narrative now less guarded and evasive, paranoia attenuating and less argumentative, reports he feels calmer with the medication; says he is taking meds, better rapport with md and calmer, better tolerated reality testing but appears suspicious of ANDERSON and declines option and ambivalent about patching up relationship with mother but stable for DC to home, denies all SHIIP much less guarded, agreed to complete ANDERSON loading fair and improving vague at times very mild blunting paranoia much attenuated

## 2024-08-14 NOTE — BH TREATMENT PLAN - NSTXDISORGINTERPR_PSY_ALL_CORE
Patient will identify and utilize 2 coping skills that assist organizing. Over the next few days Psych rehab staff will continue to engage and support patient throughout.
Patient has demonstrated minimal progress towards psychiatric rehabilitation goals of identifying 2 coping skills that assist in organizing. Patient minimally receptive and cooperative in the session. Patient is disorganized and responding to internal stimuli. Patient still sexually preoccupied. Patient often paces the unit and to himself. Patient minimally goes to group therapy sessions.  Over the next few days Psych rehab staff will continue to engage and support patient throughout.
Patient has demonstrated minimal progress towards psych rehab goals of identifying 2 coping skills that assist in organizing. Patient was unable to identify 2 coping skills that assist in organization. Overall patient has made fair progress towards psychiatric symptoms. Patient is less grandiose and elevated. Patient still remains to himself. Patient usually paces the hallways and does not attend groups. Over the next few days Psych rehab staff will continue to engage and support patient throughout.
Writer met with patient to discuss progress towards their goal. Patient was calm, soft/soft/quiet, and attentive. Patient has shown slight improvements towards his goal. Patient was able to identify one coping skill that helps with his symptoms which is coloring/drawing. Throughout the week patient attended a minimum number of groups. Writer will engage pt regularly to determine progress towards goals.

## 2024-08-14 NOTE — BH INPATIENT PSYCHIATRY DISCHARGE NOTE - NSBHDCRISKMITIGATEFT_PSY_ALL_CORE
ANDERSON for safety and compliance  Suggest individual and group therapy  MI for all substance use cessation

## 2024-08-14 NOTE — BH INPATIENT PSYCHIATRY DISCHARGE NOTE - HPI (INCLUDE ILLNESS QUALITY, SEVERITY, DURATION, TIMING, CONTEXT, MODIFYING FACTORS, ASSOCIATED SIGNS AND SYMPTOMS)
Patient is a 44 year old male, single, domiciled in [place] with his mother, has a 20 y/o son, previously a  in [place], currently works making oils, unknown PPHx as patient denies previous diagnoses, and hospitalizations, no prior suicide attempts, smokes cigars socially, denies other substances, denies PMH brought in by EMS, activated by patient's mother during an argument.     On interview, patient reports that there is something speaking in his ears and trying to control him. He states that he believes the voice is from his mother's nephew, who he believes is trying to taunt and make fun of him because he is jealous of the patient. He states that at time, he feels that the voice is "closing my ears and throat," and that he has to put pressure on his neck in order to breathe (demonstrates putting pressure over both carotids and also trying to pull his trachea aggressively to the side of his neck). He endorses visual hallucination of his mother's nephew as well. He states that he has been hearing voices for the past 4 years, but denies history of psychiatric medications or psychiatric diagnoses. He reports concern that his mother is also involved with his nephew trying to control him. He reports that there is a device in his head that he believes is present because of his mother's nephew. He endorses delusions of thought insertion, thought deletion, thought broadcasting, and body control, though does state that he is able to resist the thoughts that attempt to control his actions with some concentration/effort. He endorses poor sleep due to the AH, stating that he has slept about 4 hours or less per night and doesn't remember the last time he slept well because it's difficult for him to sleep with the voice. When offered medication to help decrease the voices so he can sleep, he says that he does not need medication but instead needs us to examine his ears so we can see where the device creating the voice is implanted in him. He denies symptoms of cory and depression including grandiosity, racing thoughts, distractibility, increased energy, depressed or elevated mood, anhedonia, and appetite changes. Pt states that he can't remember his mother's phone number for collateral and deleted her phone number because he believes she's involved with her nephew who he believes is trying to control him. He denies SI/HI.    See below for collateral from patient's mother, Adria Prakash ([phone]).  Pt's mother states that the patient has been hearing a voice that sounds like her nephew and accusing her of working with her nephew to control the patient. Mother states that she called EMS today because patient arguing with her and yelling. Mother states that the patient has been hearing voices for years off and on. She states that she's unsure if the patient has seen a psychiatrist before but states that the patient went to a doctor in [facility] and was told that he has schizophrenia during a time when the patient was hearing voices, but patient didn't believe the doctor. Mother states that patient's maternal uncle has bipolar disorder. Mother states that she is going to [place] tomorrow and may not be available if she is called since she isn't sure how the service there will be.    On ML6, very little of ED note corroborated by pt. Appears rather guarded, evasive and paranoid, yet, seen sitting and talking with female peer on unit, Canadian speaking only, who is also quite paranoid. Says he got in argument with mother, and mother called police and pt BIBEMS to [facility] ED. Does not endorse any psych hx and does not refer to much of discussion in ED note for unclear reasons. 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 Asks for NRT, so gum ordered. Does not endorse wish to harm self or others, with adequate behavioral control. 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 Denies wish to harm any party.

## 2024-08-14 NOTE — BH TREATMENT PLAN - NSPTSTATEDGOAL_PSY_ALL_CORE
I have a chip implanted in my ear... it is my mothers nephew.

## 2024-08-14 NOTE — BH TREATMENT PLAN - NSTXPLANTHERAPYSESSIONSFT_PSY_ALL_CORE
[facility]    Patient: Mark Ramirez, 62 y.o.   : 1960  2021        Subjective     Chief Complaint   Patient presents with    Follow-up     6 mo f/u with robson/ohs        HPI  Kourtney Kelly is here for follow up for his COPD and ROBSON/OHS with BiPAP use. Patient using O2 2LPM with BiPAP only. 6MWT done 3/2021 and patient did not qualify for home oxygen supplementation at rest or with activity. Last PFT done 3/2021 showing moderately severe obstruction with mild restriction; with significant BD response in FEV1. Former smoker quit in  0.25 PPD for 2 years with a 0.5 PYH  BiPAP use is not consistent but AHI is controlled at 4.8. Patient c/o leak with mask at night blowing air into his eye   HX of prostate cancer s/p radiation (3/2021) following with Urology here at [facility] ([facility]). Recently seen Dr. Amanda Carrera MD office note reviewed. Cystoscopy done in the office on 21. Covid vaccinations done x 2. Flu vaccine done 21. Only using nebulizer daily and PRN- interested in Ventolin HFA or proair and will try to start on maintenance inhaler of Anoro. No other medical issues today- no pulmonary complaints     Progress History:   Since last visit any new medical issues? No  New ER or hospital visits? No  Any new or changes in medicines? No  Using inhalers? Yes   Are they helpful?  Yes   Flu vaccine- done   Pneumonia vaccine- 2018 pneumovax   Past Medical hx   PMH:  Past Medical History:   Diagnosis Date    Abnormal stress test 2012    Lateral Wall Ischemia- done at [facility]    CAD (coronary artery disease)     Cancer ([facility] [address]     Prostate    Chronic low back pain     Diabetes mellitus ([address]     Diabetes mellitus ([address]     Hyperlipidemia     Hypertension     Hypertriglyceridemia     MI (myocardial infarction) ([facility] [address]     Obesity     Sleep apnea     has CPAP    Viral pneumonia 10/15/2020     SURGICAL HISTORY:  Past Surgical History:   Procedure
Laterality Date    BACK SURGERY  1997    L4 & L5 fusion    CARDIAC CATHETERIZATION  10/2019   Kathie Dai CARDIAC SURGERY  2004    Cardiac cath with stent placement x1    COLONOSCOPY  2010    CORONARY ANGIOPLASTY WITH STENT PLACEMENT  10/17/2019    HERNIA REPAIR      Mesh repair in abdomen.  ULTRASOUND PROSTATE/TRANSRECTAL N/A 2020    TRANSRECTAL ULTRASOUND WITH PROSTATE BIOPSY performed by Rafaela Castro MD at [address] [place] [address] Road:  Social History     Tobacco Use    Smoking status: Former Smoker     Packs/day: 0.25     Years: 2.00     Pack years: 0.50     Types: Cigarettes     Quit date: 1995     Years since quittin.8    Smokeless tobacco: Never Used   Vaping Use    Vaping Use: Never used   Substance Use Topics    Alcohol use: No    Drug use: No     ALLERGIES:  Allergies   Allergen Reactions    Ace Inhibitors      When asked Oct 2020, patient was unsure of allergy/reaction. Patient takes/tolerates enalapril.     Baclofen Other (See Comments)     Lightheadedness, dizziness    Darvocet [Propoxyphene N-Acetaminophen] Nausea Only     Felt sickly    Darvon [Propoxyphene Hcl]      Felt sickly    Flexeril [Cyclobenzaprine] Other (See Comments)     Headache    Morphine Nausea Only     Pt reported feeling sickly    Motrin [Ibuprofen Micronized] Nausea Only     Pt reported feeling very sick    Tylenol [Acetaminophen] Nausea Only    Ultram [Tramadol] Itching     FAMILY HISTORY:  Family History   Problem Relation Age of Onset    Diabetes Mother     Heart Disease Mother     High Blood Pressure Mother     Arthritis Father     Diabetes Father     Heart Disease Father     High Blood Pressure Father     Diabetes Sister     Diabetes Brother     Heart Disease Brother     Cancer Neg Hx     Stroke Neg Hx      CURRENT MEDICATIONS:  Current Outpatient Medications   Medication Sig Dispense Refill    albuterol sulfate HFA (PROAIR HFA) 108 (90 Base) MCG/ACT
inhaler Inhale 2 puffs into the lungs every 6 hours as needed for Wheezing or Shortness of Breath 1 each 3    umeclidinium-vilanterol (ANORO ELLIPTA) 62.5-25 MCG/INH AEPB inhaler Inhale 1 puff into the lungs daily 3 each 3    fluticasone (FLONASE) 50 MCG/ACT nasal spray One spray in each nostril q hs 3 each 1    oxybutynin (DITROPAN XL) 10 MG extended release tablet Take 1 tablet by mouth daily 30 tablet 1    ascorbic acid (RA VITAMIN C/ERNA HIPS) 500 MG tablet Take 1 tablet by mouth daily 30 tablet 3    insulin lispro (HUMALOG) 100 UNIT/ML injection vial inject 10 units subcutaneously twice a day if needed for HIGH BLOOD SUGAR. 30 mL 3    ALPRAZolam (XANAX) 1 MG tablet Take 1 tablet by mouth 2 times daily for 30 days. 60 tablet 0    Cyanocobalamin ER (RA VITAMIN B-12 TR) 1000 MCG TBCR TAKE 1 TABLET BY MOUTH DAILY 30 tablet 2    BD INSULIN SYRINGE U/F 31G X 5/16\" 1 ML MISC use as directed three times a day      LANTUS SOLOSTAR 100 UNIT/ML injection pen INJECT 40 UNITS INTO THE SKIN 2 TIMES A DAY 96 mL 0    B-D ULTRAFINE III SHORT PEN 31G X 8 MM MISC USE TWICE DAILY WITH THE [employer] PEN.  200 each 3    albuterol (PROVENTIL) (2.5 MG/3ML) 0.083% nebulizer solution Take 3 mLs by nebulization every 4 hours as needed for Wheezing 2 Package 1    ferrous sulfate (IRON 325) 325 (65 Fe) MG tablet Take 1 tablet by mouth every 48 hours 30 tablet 3    cromolyn (OPTICROM) 4 % ophthalmic solution instill 1 drop into both eyes four times a day 10 mL 0    enalapril (VASOTEC) 10 MG tablet Take 1 tablet by mouth daily 90 tablet 1    pantoprazole (PROTONIX) 40 MG tablet Take 1 tablet by mouth daily 90 tablet 1    metFORMIN (GLUCOPHAGE) 1000 MG tablet Take 1 tablet by mouth daily (with breakfast) 180 tablet 1    bicalutamide (CASODEX) 50 MG chemo tablet Take 1 tablet by mouth daily 90 tablet 3    Insulin Syringes, Disposable, U-100 1 ML MISC 1 each by Does not apply route 3 times daily 31 gauge x 8 mm  ultrafine 2 100
each 5    amLODIPine (NORVASC) 10 MG tablet Take 10 mg by mouth daily      clopidogrel (PLAVIX) 75 MG tablet take 1 tablet by mouth once daily 90 tablet 1    metoprolol tartrate (LOPRESSOR) 25 MG tablet Take 1 tablet by mouth 2 times daily  0    Vitamin D, Cholecalciferol, 25 MCG (1000 UT) TABS Take 1,000 Units by mouth daily      pravastatin (PRAVACHOL) 40 MG tablet Take 1 tablet by mouth nightly  0    gemfibrozil (LOPID) 600 MG tablet Take 1 tablet by mouth 2 times daily 180 tablet 1    nitroGLYCERIN (NITROSTAT) 0.4 MG SL tablet Place 0.4 mg under the tongue every 5 minutes as needed for Chest pain       aspirin 325 MG tablet Take 325 mg by mouth daily.  leuprolide (LUPRON) 45 MG injection Inject 45 mg into the muscle once for 1 dose (Patient not taking: Reported on 9/8/2021) 45 mg 1    tamsulosin (FLOMAX) 0.4 MG capsule Take 2 capsules by mouth daily Take one capsule daily to facilitate passage of ureteral stone 60 capsule 3     No current facility-administered medications for this visit. ROS   Review of Systems   Constitutional: Negative. Negative for chills, fever and unexpected weight change. HENT: Negative. Eyes: Negative. Respiratory: Positive for shortness of breath (only with exertion). Negative for chest tightness, wheezing and stridor. Cardiovascular: Negative for chest pain and leg swelling. Gastrointestinal: Negative. Negative for diarrhea, nausea and vomiting. Endocrine: Negative. Genitourinary: Positive for frequency. Negative for dysuria. Musculoskeletal: Positive for arthralgias and back pain. Skin: Negative. Allergic/Immunologic: Negative. Neurological: Negative. Hematological: Negative. Psychiatric/Behavioral: Negative.          Physical exam   /60 (Site: Left Upper Arm, Position: Sitting, Cuff Size: Large Adult)   Pulse 75   Temp 97.8 °F (36.6 °C)   Ht 5' 9\" (1.753 m)   Wt 290 lb 3.2 oz (131.6 kg)   SpO2 96% Comment: on r/a
BMI 42.86 kg/m²    Wt Readings from Last 3 Encounters:   11/02/21 290 lb 3.2 oz (131.6 kg)   09/27/21 284 lb (128.8 kg)   09/10/21 280 lb 6.4 oz (127.2 kg)       Physical Exam  Vitals and nursing note reviewed. Constitutional:       General: He is not in acute distress. Appearance: He is well-developed. He is obese. HENT:      Head: Normocephalic and atraumatic. Neck:      Trachea: No tracheal deviation. Cardiovascular:      Rate and Rhythm: Normal rate and regular rhythm. Heart sounds: Normal heart sounds. No murmur heard. Pulmonary:      Effort: Pulmonary effort is normal. No respiratory distress. Breath sounds: Normal breath sounds. No stridor. No wheezing or rales. Chest:      Chest wall: No tenderness. Abdominal:      General: Bowel sounds are normal. There is no distension. Palpations: Abdomen is soft. Skin:     General: Skin is warm and dry. Capillary Refill: Capillary refill takes less than 2 seconds. Neurological:      Mental Status: He is alert and oriented to person, place, and time. Psychiatric:         Behavior: Behavior normal.         Thought Content: Thought content normal.         Judgment: Judgment normal.          results   Lung Nodule Screening     [] Qualifies    [x] Does not qualify   [] Declined    [] Completed  The USPSTF recommends annual screening for lung cancer with low-dose computed tomography (LDCT) in adults aged 48 to [de-identified] years who have a 30 pack-year smoking history and currently smoke or have quit within the past 20 years. Screening should be discontinued once a person has not smoked for 20 years or develops a health problem that substantially limits life expectancy or the ability or willingness to have curative lung surgery. ESS: 0  SAQLI:  94                Assessment      Diagnosis Orders   1.  Mixed restrictive and obstructive lung disease (HCC)  albuterol sulfate HFA (PROAIR HFA) 108 (90 Base) MCG/ACT inhaler
umeclidinium-vilanterol (ANORO ELLIPTA) 62.5-25 MCG/INH AEPB inhaler   2. Obesity hypoventilation syndrome ([address]  DME Order for CPAP as OP   3. ROBSON treated with BiPAP  DME Order for CPAP as OP         Plan   -Will start patient on Anoro 1puff daily in am. Mario Bacon educated and demonstrated by me in my office how to use Anora. Patient verbalizes understanding. He was detailed about mechanism of action of drug along with associated side effects. He agreed to take the risk and medication. He verbalizes understanding.   -RX done for Proair HFA  -Continue BiPAP use with O2 2LPM bleed in- discussed compliancy  -Will call [address] [employer] today to discuss home oxygen needs and mask issue with mask blowing air into patients eye while sleeping  -Sample mask given to patient Resmed FFM AirFit F30i- size medium patient can try to see if this works better for him   -Advised to maintain pneumonia vaccine with PCP and to take flu vaccine this coming season.  -Advised patient to call office with any changes, questions, or concerns regarding respiratory status  -Weight loss encouraged     Will see Mario Bacon back in: 6 months    Kalyani Payment, [phone]   11/2/2021
  07-31-24  Type of therapy: Medication management, Psychoeducation, Relapse prevention  --  --  --  --  --    08-01-24  Type of therapy: Medication management, Psychoeducation, Relapse prevention  --  --  --  --  --    08-03-24  Type of therapy: Medication management, Psychoeducation, Relapse prevention  --  --  --  --  --    08-04-24  Type of therapy: Medication management, Psychoeducation, Relapse prevention  --  --  --  --  --  
  07-28-24  Type of therapy: Medication management, Psychoeducation, Relapse prevention  --  --  --  --  --    07-30-24  Type of therapy: Medication management, Psychoeducation, Relapse prevention  --  --  --  --  --  
  07-31-24  Type of therapy: Medication management, Psychoeducation, Relapse prevention  --  --  --  --  --    08-01-24  Type of therapy: Medication management, Psychoeducation, Relapse prevention  --  --  --  --  --    08-03-24  Type of therapy: Medication management, Psychoeducation, Relapse prevention  --  --  --  --  --    08-04-24  Type of therapy: Medication management, Psychoeducation, Relapse prevention  --  --  --  --  --

## 2024-08-14 NOTE — BH INPATIENT PSYCHIATRY DISCHARGE NOTE - REASON FOR ADMISSION
Psychosis with prominent auditory hallucinations and disturbance of mood in context of no prior dx or treatment and cannabis use disorder

## 2024-08-14 NOTE — BH DISCHARGE NOTE NURSING/SOCIAL WORK/PSYCH REHAB - NSDCPRGOAL_PSY_ALL_CORE
Writer met with patient to discuss their final progress towards their goal. Patient was calm, engaged and dressed appropriately. Upon discharge, patient has shown progress towards their goal. Patient was able to identify drawing and listening to music to help with symptoms. Throughout patients stay on the unit, patient attended a fair number of Psychiatric Rehabilitation groups. Patient completed a safety plan before discharge.  Writer met with patient to discuss their final progress towards their goal. Patient was calm, engaged and unkempt. Upon discharge, patient has shown progress towards their goal. Patient was able to identify drawing and listening to music to help with symptoms. Throughout patients stay on the unit, patient attended a fair number of Psychiatric Rehabilitation groups. Patient completed a safety plan before discharge.

## 2024-08-14 NOTE — BH TREATMENT PLAN - NSTXCOPEINTERRN_PSY_ALL_CORE
Educate patient on and reinforce positive coping skills.
Educate patient on and reinforce positive coping skills.

## 2024-08-14 NOTE — BH INPATIENT PSYCHIATRY DISCHARGE NOTE - NSBHFUPINTERVALHXFT_PSY_A_CORE
MICHELLE COURTNEY report received and case reviewed at team meeting in a.m., patient seen and MSE done. No acute events overnight and overall patient shows much improvement in relatedness and does not endorse any auditory hallucinations or voice to this of his cousin or other distressing symptoms that brought him to the hospital. He has tolerated discussion with reality testing well asked today if he had considered patient and MD calling his mother, he says that he still has not thought about it. Remains ambivalent on this other topics. Social work met with patient and explained aftercare is not finalized and possibly will be finalized by Wednesday, Thursday at latest. Patient declines option of an ANDERSON. Does not appear frankly, psychotic or manic or depressed. No new side effects reported or observed.  Rx meds sent to [facility] for discharge day, ie, WED vs THURS.  Stable for DC to home.  Denies all SHIIP. PN for Day of Discharge 8/20/24  RN report received and case reviewed at team meeting in a.m. with full team, patient seen and MSE done. No acute events overnight and patient does show improvement, and tolerated phone meeting on MON with mother well.  No new pain or other complaints made to MD yesterday or today.  Patient was tolerant of this discussion and did accept that mother cared about him and loved patient as he was her only son. MD explained diagnosis of bipolar disorder with significant paranoia, but also explained that cannabis usage was not helping patient and likely worsening symptoms. In summary phone meeting was very productive and mother was very appreciative of progress inpatient and patient also felt much improved and ready for discharge in the a.m. All agreed that the patient had made much progress. Confirms this today and feels ready and stable for DC to home of mother.  Denies all SHIIP and AVTH and no cory or depression evident.  No new side effects reported or observed.  MD also furnished patient with card so that he or mother could call attending if any further questions arose.

## 2024-08-14 NOTE — BH INPATIENT PSYCHIATRY DISCHARGE NOTE - NSDCMRMEDTOKEN_GEN_ALL_CORE_FT
levothyroxine 88 mcg (0.088 mg) oral tablet: 1 tab(s) orally once a day (at bedtime)  paliperidone 9 mg oral tablet, extended release: 1 tab(s) orally once a day   Invega 3 mg oral tablet, extended release: 1 tab(s) orally once a day (at bedtime) can stop after 10 days  Invega Sustenna 117 mg/0.75 mL intramuscular suspension, extended release: 117 milligram(s) intramuscularly every 4 weeks Loadings x2 at 156/117 mg IM done; lower dose d/t bump in Cr; NEXT DUE: 9/16/24  levothyroxine 88 mcg (0.088 mg) oral tablet: 1 tab(s) orally once a day (at bedtime) best on empty stomach with no food or snack

## 2024-08-14 NOTE — BH TREATMENT PLAN - NSTXPATIENTPARTICIPATE_PSY_ALL_CORE
Patient participated in identification of needs/problems/goals for treatment/Patient participated in defining interventions
Patient participated in identification of needs/problems/goals for treatment
Patient participated in identification of needs/problems/goals for treatment/Patient participated in defining interventions
Patient participated in identification of needs/problems/goals for treatment

## 2024-08-14 NOTE — BH INPATIENT PSYCHIATRY DISCHARGE NOTE - HOSPITAL COURSE
To be updated by Dr. [email or web address]    Pt on ANDERSON medication? Y-- see below Updated by Dr. [email or web address]    Pt on ANDERSON medication? Y-- see below    BD: [birth year]  CLINICAL COURSE  [facility] Admit dates on [facility]: 7/23/24 to 8/14/25  Pt arrived to the [facility] unit in the above context.   MICHELLE arrived to the [facility] prominently paranoid and concealing of AH that had been ongoing for several years, and this had apparently provoked an argument with his mother and patient only insisted that he did not love his mother anymore and felt that she lied to him, yet would not reveal to MD or team about what issue. Patient would not reveal any further details, but it appeared related to AH/RIS and patient not obtaining treatment of any sort. Pt denied living with mother, and stated he lived alone, and made his living/provided for himself with some vagueness. 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 He was unable to clarify with any detail whether he was able to pay bills or function adequately or meet the minimum threshold of financial viability given his vagueness and avoidance of answering questions (paranoia and thought disorder evident). He insisted, that a device had been implanted in his head and he was hearing his mother's nephews voice belittling him and similar themes; pt never referred to this individual as his cousin and was unable to explain this phenomenon other than as jealousy. In this regard, he was very poor at reality testing and team suggested medication would help patient with his distress and he was initially very resistant, even hostile to medication, suggesting MOO would be required. Team advocated for trial of Invega, and it was started at 3 mg and up titrated gradually to six and then 9 mg qhs to good effect but for a brief period during which patient became increasingly paranoid and team suspected cheeking; patient then again did show improvements and showed better response to 9 mg nightly. Patient became better related and tolerated reality testing and explanation of dopamine signaling deficits in the brain and he was given a hypo/cory checklist that showed a very high score consistent with bipolar disorder 28/32. Pt showed slightly oddly related behavior, however patient was socializing and able to interact with MD but did remain appreciably isolative on the unit. Patient showed adequate behavioral control that improved and overall attended groups and adequately related in group therapy. He was given psychoeducation on ANDERSON options but refused and team suspected patient planned no compliance after discharge. Team encouraged phone meeting with mother of patient but patient was resistant. Additionally labs were assessed and Patient was deemed to be hypothyroid so thyroid medication was started as Synthroid 88 µg daily later rescheduled as nightly so as to aid compliance. Pt did endorse regular cannabinoid use via oil and presumably smoking, and MI was used to discourage patient from further use, which would likely undo the effects of Invega medication. Pt did agree to ANDERSON loading and this was done as 156/117 mg rather than 234/156 mg convention due to slight increase in creatinine/decreased GFR given renal clearance of invega. Patient was deemed psychiatrically stable for discharge with notably attenuated psychosis/paranoia/delusions and no wish for self harm or harm of others or evident SHIIP and adequate behavioral control and no AVTH and no cory or depression and mood much improved and anxiety attenuated. Pt held productive phone meeting with mother on day prior to discharge, and both thanked MD for helping pt to get on track.   Modifiable risks attenuated. Low acute risk of harm to self or suicide. The patient is at elevated chronic risk of self-harm due to an underlying mood and psychotic illness. Other risk factors include substance use, poor frustration tolerance, and lack of insight. Protective factors for suicide include improved insight, treatment engagement, medication adherence, lack of access to firearms, supportive social network, future-oriented, patient denies current SIIP and SIIP throughout hospitalization. Risk factors mitigated during this hospitalization include poor frustration tolerance and mood lability and pt now with ANDERSON medication for safety and compliance.   Low acute risk of violence or aggression. Risk factors include AH/paranoid delusions towards family members, substance use, at times impulsivity, poor frustration tolerance but no aggression during hospitalization. Protective factors include denies current homicidal, aggressive, or violent ideation, intent, or plan, patient was in adequate behavioral control during most of hospitalization, with some improvements in insight. Risk factors mitigated during this hospitalization include poor frustration tolerance and mood lability and pt now with ANDERSON medication for safety and compliance.   Acute MED issues during stay: hypothyroid, as above, now on Synthroid.  Please contact Dr. Jarvis/Dariel Attending and Unit Chief, if any questions:  [phone] or [phone]/Unit or [email or web address]  See DISCHARGE PLAN and Rx meds at discharge, below.      DSM5 DD  BAD+P, current episode mixed features w/o P & w/o catatonia  r/o SAD, BAD+P, current episode mixed features w/o P & w/o catatonia  r/0 SCZ  Anxiety disorder NOS  Cannabis use disorder    DISCHARGE PLAN  1)	Pt stable for DC to home with Rx meds via [facility];  2)	Psychopharm, as above.  3)	Long Acting Injectible medication (ANDERSON):   Invega Sustenna, as above. No PO bridging needed. Loading at 156/117 d/t slight increase in creatinine. Can do 117 mg vs 156 mg, as per symptoms in 4 weeks when due. If creatinine significantly elevated/GFR decreased, suggest 117 mg.  4)	AFTERCARE: see last page  5)	Medical issues needing f/u: hypothyroid      Matheus Patton MD, MS  Inpatient Psychiatry Attending, [facility] & Unit Chief   of Psychiatry  [address] [place] [address]  Tel: [phone], office  [facility] unit: [phone]  [facility] Fax: [phone], also: [phone]   [facility]  [facility]   Visit us at [facility] or medicine.[facility]  #[employer]

## 2024-08-14 NOTE — BH TREATMENT PLAN - NSTXTOBACOGOAL_PSY_ALL_CORE
Will accept nicotine replacement therapy

## 2024-08-14 NOTE — BH TREATMENT PLAN - NSTXSUPORTGOAL_PSY_ALL_CORE
Patient will attend groups to promote social skill development

## 2024-08-14 NOTE — BH INPATIENT PSYCHIATRY DISCHARGE NOTE - DESCRIPTION
From [place]. Moved to [place] in [place]23. Lives with mom. Worked as  in [place], now works making/selling castor oils in the [place]. Has an adult son.

## 2024-08-15 PROCEDURE — 99232 SBSQ HOSP IP/OBS MODERATE 35: CPT

## 2024-08-15 PROCEDURE — 99223 1ST HOSP IP/OBS HIGH 75: CPT | Mod: GC

## 2024-08-15 RX ADMIN — PALIPERIDONE 9 MILLIGRAM(S): 3 TABLET, EXTENDED RELEASE ORAL at 08:44

## 2024-08-15 RX ADMIN — Medication 88 MICROGRAM(S): at 20:01

## 2024-08-15 RX ADMIN — PALIPERIDONE 156 MILLIGRAM(S): 3 TABLET, EXTENDED RELEASE ORAL at 08:45

## 2024-08-15 NOTE — BH INPATIENT PSYCHIATRY PROGRESS NOTE - NSBHMSEMOOD_PSY_A_CORE
Normal
Normal
Normal/Anxious/Other
Normal
Normal
Normal/Anxious
Normal
Normal/Anxious
Normal
Normal
Normal/Anxious
Normal

## 2024-08-15 NOTE — BH INPATIENT PSYCHIATRY PROGRESS NOTE - NSBHPSYCHOLCOGORIENT_PSY_A_CORE
Not fully oriented...
Oriented to time, place, person, situation
Not fully oriented...
Oriented to time, place, person, situation
Not fully oriented...
Oriented to time, place, person, situation
Not fully oriented...
Not fully oriented...
Oriented to time, place, person, situation
Not fully oriented...

## 2024-08-15 NOTE — BH INPATIENT PSYCHIATRY PROGRESS NOTE - NSBHMSEINTELL_PSY_A_CORE
Average
Below Average
Average
Average
Below Average
Average
Below Average
Below Average
Average
Below Average
Below Average
Average
Below Average

## 2024-08-15 NOTE — BH INPATIENT PSYCHIATRY PROGRESS NOTE - NSBHMSEBEHAV_PSY_A_CORE
Cooperative/Other
Cooperative/Uncooperative/Other
Cooperative/Other

## 2024-08-15 NOTE — BH INPATIENT PSYCHIATRY PROGRESS NOTE - NSBHMSEATTEN_PSY_A_CORE
Normal
Impaired
Normal
Normal

## 2024-08-15 NOTE — BH INPATIENT PSYCHIATRY PROGRESS NOTE - NSBHMSETHTCONTENT_PSY_A_CORE
Delusions/Other
Delusions
Delusions/Other
Delusions
Delusions/Other
Delusions/Other
Delusions
Delusions/Other
Delusions
Unremarkable
Delusions
Delusions
Delusions/Other
Delusions
Delusions/Other
Delusions/Other

## 2024-08-15 NOTE — BH INPATIENT PSYCHIATRY PROGRESS NOTE - NSBHMSERECMEM_PSY_A_CORE
Impaired
Normal
Normal
Impaired
Normal
Normal
Impaired
Normal
Impaired
Impaired
Normal
Normal

## 2024-08-15 NOTE — BH INPATIENT PSYCHIATRY PROGRESS NOTE - NSBHMSEAFFQUAL_PSY_A_CORE
Depressed/Irritable/Anxious
Anxious
Depressed/Irritable/Anxious
Anxious
Euthymic
Anxious
Anxious
Depressed/Irritable/Anxious
Anxious
Depressed/Irritable/Anxious
Anxious
Depressed/Irritable/Anxious
Depressed/Irritable/Anxious
Euthymic
Depressed/Irritable/Anxious

## 2024-08-15 NOTE — BH INPATIENT PSYCHIATRY PROGRESS NOTE - ADDITIONAL DETAILS / COMMENTS
notably poor insight
both improved and fair on some topics
notably poor insight
at times shows slight improvements
both improved and fair on some topics
both now slightly improved, refusing ANDERSON
both now slightly improved, refusing ANDERSON but less argumentative
both now slightly improved, refusing ANDERSON

## 2024-08-15 NOTE — BH INPATIENT PSYCHIATRY PROGRESS NOTE - NSBHMSERELATED_PSY_A_CORE
Other
Fair
Fair
Other
Fair
Other
Fair
Other
Fair
Other
Fair
Other
Fair
Fair

## 2024-08-15 NOTE — BH INPATIENT PSYCHIATRY PROGRESS NOTE - NSBHMSEKNOWHOW_PSY_ALL_CORE
Current Events/Educational attainment/Vocabulary/Other...
Current Events/Educational attainment/Vocabulary/Other...
Vocabulary/Other...
Current Events/Educational attainment/Vocabulary/Other...
Vocabulary
Current Events/Educational attainment/Vocabulary/Other...
Current Events/Educational attainment/Vocabulary/Other...
Vocabulary/Other...
Vocabulary
Current Events/Educational attainment/Vocabulary/Other...
Vocabulary
Current Events/Educational attainment/Vocabulary/Other...
Current Events/Educational attainment/Vocabulary/Other...
Vocabulary
Current Events/Educational attainment/Vocabulary/Other...
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
## 2024-08-15 NOTE — BH INPATIENT PSYCHIATRY PROGRESS NOTE - NSBHPSYCHOLCOGABN_PSY_A_CORE
disoriented to situation

## 2024-08-15 NOTE — BH INPATIENT PSYCHIATRY PROGRESS NOTE - NSBHMSEPERCEPT_PSY_A_CORE
Auditory hallucinations/Other
Auditory hallucinations/Visual hallucinations/Other
Auditory hallucinations/Other
Auditory hallucinations/Other
Auditory hallucinations/Visual hallucinations/Other
Auditory hallucinations/Other
Auditory hallucinations/Other
No abnormalities
Auditory hallucinations/Visual hallucinations/Other
No abnormalities
Auditory hallucinations/Other
No abnormalities
Auditory hallucinations/Other
Auditory hallucinations/Other
No abnormalities
Auditory hallucinations/Other
Auditory hallucinations/Other

## 2024-08-15 NOTE — BH INPATIENT PSYCHIATRY PROGRESS NOTE - OTHER
remains paranoid; repeatedly denies that he lives with mother yet, arrived from [place] in past year and lives with mother pt narrative slightly more affect evident, mildly blunted now less guarded and evasive, paranoia attenuating and less argumentative, reports he feels calmer with the medication; says he is taking meds, better rapport with md and calmer, better tolerated reality testing but appears suspicious of ANDERSON and declines option and ambivalent about patching up relationship with mother but stable for DC to home, denies all SHIIP; collateral from mother indicates pt lives with mother, but pt continues to deny this fact

## 2024-08-15 NOTE — CONSULT NOTE ADULT - ASSESSMENT
44  year-old M admitted to [facility] for psychosis. Consulted to evaluate reported oral stitches       Reported Oral Stitches- Unable to appreciate it on exam  -Pt states that he has the information for his scheduled appointment on 8/16 in his phone but does not know it off the top of his head. If possible and safe , please access pts phone with the patient to obtain information from [facility]. Hopefully, would be able to call for collateral on planned appointment. Based on information can consider evaluation by our specialty services here.   -If unable to obtain additional information, alternative is to have patient follow up as outpt once discharge. No alarming signs appreciated on exam.     Hypothyroid -Noted elevated TSH levels and low T4. Unclear pt adherance prior to admission  -c/w synthroid. Should be taken in the am on an empty stomach atleat 1 hour prior to food  -repeat TFTs in 4-7 weeks     Psychosis  -management per primary psychiatry team

## 2024-08-15 NOTE — BH INPATIENT PSYCHIATRY PROGRESS NOTE - NSBHMSEAFFCONG_PSY_A_CORE
Female
Congruent

## 2024-08-15 NOTE — BH INPATIENT PSYCHIATRY PROGRESS NOTE - NSBHMSEAFFRANGE_PSY_A_CORE
Constricted
Blunted/Constricted
Blunted/Constricted
Constricted/Other
Constricted
Blunted/Constricted/Other
Constricted
Blunted/Constricted
Constricted
Blunted/Constricted
Constricted
Blunted/Constricted
Constricted
Constricted
Blunted/Constricted

## 2024-08-15 NOTE — BH INPATIENT PSYCHIATRY PROGRESS NOTE - NSBHMSETHTPROC_PSY_A_CORE
Linear/Perseverative/Illogical/Impaired reasoning/Other
Linear/Circumstantial/Tangential/Perseverative/Thought blocking/Impaired reasoning
Linear/Perseverative/Impaired reasoning
Linear/Circumstantial/Tangential/Perseverative/Thought blocking/Impaired reasoning
Linear/Circumstantial/Tangential/Perseverative/Thought blocking/Impaired reasoning
Linear/Perseverative/Impaired reasoning
Linear/Perseverative/Impaired reasoning
Linear/Perseverative/Illogical/Impaired reasoning/Other
Linear/Circumstantial/Tangential/Perseverative/Thought blocking/Impaired reasoning
Linear/Perseverative/Illogical/Impaired reasoning/Other
Linear/Perseverative/Impaired reasoning
Linear/Circumstantial
Linear/Circumstantial/Tangential/Perseverative/Thought blocking/Impaired reasoning
Linear/Circumstantial
Linear/Circumstantial/Tangential/Perseverative/Thought blocking/Impaired reasoning

## 2024-08-15 NOTE — BH INPATIENT PSYCHIATRY PROGRESS NOTE - NSBHMSEJUDGE_PSY_A_CORE
Poor
details…
Poor
Fair
Poor
Fair
Poor

## 2024-08-16 DIAGNOSIS — R68.84 JAW PAIN: ICD-10-CM

## 2024-08-16 DIAGNOSIS — R20.0 ANESTHESIA OF SKIN: ICD-10-CM

## 2024-08-16 PROCEDURE — 99233 SBSQ HOSP IP/OBS HIGH 50: CPT

## 2024-08-16 RX ORDER — IBUPROFEN 600 MG
400 TABLET ORAL EVERY 4 HOURS
Refills: 0 | Status: DISCONTINUED | OUTPATIENT
Start: 2024-08-16 | End: 2024-08-20

## 2024-08-16 RX ORDER — ACETAMINOPHEN 325 MG/1
650 TABLET ORAL EVERY 6 HOURS
Refills: 0 | Status: DISCONTINUED | OUTPATIENT
Start: 2024-08-16 | End: 2024-08-20

## 2024-08-16 RX ADMIN — PALIPERIDONE 9 MILLIGRAM(S): 3 TABLET, EXTENDED RELEASE ORAL at 09:17

## 2024-08-16 RX ADMIN — Medication 88 MICROGRAM(S): at 20:21

## 2024-08-17 RX ADMIN — PALIPERIDONE 9 MILLIGRAM(S): 3 TABLET, EXTENDED RELEASE ORAL at 08:53

## 2024-08-17 RX ADMIN — Medication 88 MICROGRAM(S): at 20:11

## 2024-08-18 RX ADMIN — PALIPERIDONE 9 MILLIGRAM(S): 3 TABLET, EXTENDED RELEASE ORAL at 08:48

## 2024-08-18 RX ADMIN — Medication 88 MICROGRAM(S): at 20:15

## 2024-08-19 PROCEDURE — 99233 SBSQ HOSP IP/OBS HIGH 50: CPT

## 2024-08-19 RX ORDER — LEVOTHYROXINE SODIUM 100 MCG
1 TABLET ORAL
Qty: 30 | Refills: 0
Start: 2024-08-19 | End: 2024-09-17

## 2024-08-19 RX ORDER — PALIPERIDONE 3 MG/1
1 TABLET, EXTENDED RELEASE ORAL
Qty: 10 | Refills: 0
Start: 2024-08-19 | End: 2024-08-28

## 2024-08-19 RX ORDER — PALIPERIDONE 3 MG/1
117 TABLET, EXTENDED RELEASE ORAL
Qty: 1 | Refills: 0
Start: 2024-08-19 | End: 2024-08-19

## 2024-08-19 RX ORDER — PALIPERIDONE 3 MG/1
117 TABLET, EXTENDED RELEASE ORAL ONCE
Refills: 0 | Status: COMPLETED | OUTPATIENT
Start: 2024-08-19 | End: 2024-08-19

## 2024-08-19 RX ADMIN — Medication 88 MICROGRAM(S): at 20:28

## 2024-08-19 RX ADMIN — PALIPERIDONE 117 MILLIGRAM(S): 3 TABLET, EXTENDED RELEASE ORAL at 20:29

## 2024-08-19 RX ADMIN — PALIPERIDONE 9 MILLIGRAM(S): 3 TABLET, EXTENDED RELEASE ORAL at 08:47

## 2024-08-19 NOTE — BH INPATIENT PSYCHIATRY PROGRESS NOTE - NSTXPROBSLPPAT_PSY_ALL_CORE
SLEEP PATTERN, DISTURBED

## 2024-08-19 NOTE — BH INPATIENT PSYCHIATRY PROGRESS NOTE - NSTXPSYCHOPROGRES_PSY_ALL_CORE
No Change
Improving
No Change
Improving
No Change
Improving
Improving
No Change
Improving
No Change
Improving
Improving
No Change
Improving
Improving

## 2024-08-19 NOTE — BH INPATIENT PSYCHIATRY PROGRESS NOTE - CURRENT MEDICATION
MEDICATIONS  (STANDING):  levothyroxine 88 MICROGram(s) Oral at bedtime  paliperidone ER 9 milliGRAM(s) Oral daily    MEDICATIONS  (PRN):  acetaminophen     Tablet .. 650 milliGRAM(s) Oral every 6 hours PRN Mild Pain (1 - 3), Moderate Pain (4 - 6)  gabapentin 300 milliGRAM(s) Oral four times a day PRN anxiety  haloperidol     Tablet 5 milliGRAM(s) Oral every 4 hours PRN agitation  haloperidol    Injectable 5 milliGRAM(s) IntraMuscular once PRN severe agitation  ibuprofen  Tablet. 400 milliGRAM(s) Oral every 4 hours PRN Mild Pain (1 - 3), Moderate Pain (4 - 6)  nicotine  Polacrilex Gum 4 milliGRAM(s) Oral every 3 hours PRN Smoking Cessation  OLANZapine Injectable 10 milliGRAM(s) IntraMuscular once PRN severe agitation or aggression  
MEDICATIONS  (STANDING):  paliperidone ER 9 milliGRAM(s) Oral daily    MEDICATIONS  (PRN):  gabapentin 300 milliGRAM(s) Oral four times a day PRN anxiety  haloperidol     Tablet 5 milliGRAM(s) Oral every 4 hours PRN agitation  haloperidol    Injectable 5 milliGRAM(s) IntraMuscular once PRN severe agitation  nicotine  Polacrilex Gum 4 milliGRAM(s) Oral every 3 hours PRN Smoking Cessation  OLANZapine Injectable 10 milliGRAM(s) IntraMuscular once PRN severe agitation or aggression  
MEDICATIONS  (STANDING):  levothyroxine 88 MICROGram(s) Oral at bedtime  paliperidone ER 9 milliGRAM(s) Oral daily    MEDICATIONS  (PRN):  gabapentin 300 milliGRAM(s) Oral four times a day PRN anxiety  haloperidol     Tablet 5 milliGRAM(s) Oral every 4 hours PRN agitation  haloperidol    Injectable 5 milliGRAM(s) IntraMuscular once PRN severe agitation  nicotine  Polacrilex Gum 4 milliGRAM(s) Oral every 3 hours PRN Smoking Cessation  OLANZapine Injectable 10 milliGRAM(s) IntraMuscular once PRN severe agitation or aggression  
MEDICATIONS  (STANDING):  levothyroxine 88 MICROGram(s) Oral at bedtime  paliperidone ER 9 milliGRAM(s) Oral daily    MEDICATIONS  (PRN):  gabapentin 300 milliGRAM(s) Oral four times a day PRN anxiety  haloperidol     Tablet 5 milliGRAM(s) Oral every 4 hours PRN agitation  haloperidol    Injectable 5 milliGRAM(s) IntraMuscular once PRN severe agitation  nicotine  Polacrilex Gum 4 milliGRAM(s) Oral every 3 hours PRN Smoking Cessation  OLANZapine Injectable 10 milliGRAM(s) IntraMuscular once PRN severe agitation or aggression  
MEDICATIONS  (STANDING):  paliperidone ER 9 milliGRAM(s) Oral daily  paliperidone Injectable, Long Acting 156 milliGRAM(s) IntraMuscular once    MEDICATIONS  (PRN):  gabapentin 300 milliGRAM(s) Oral four times a day PRN anxiety  haloperidol     Tablet 5 milliGRAM(s) Oral every 4 hours PRN agitation  haloperidol    Injectable 5 milliGRAM(s) IntraMuscular once PRN severe agitation  nicotine  Polacrilex Gum 4 milliGRAM(s) Oral every 3 hours PRN Smoking Cessation  OLANZapine Injectable 10 milliGRAM(s) IntraMuscular once PRN severe agitation or aggression  
MEDICATIONS  (STANDING):  paliperidone ER 6 milliGRAM(s) Oral at bedtime    MEDICATIONS  (PRN):  gabapentin 300 milliGRAM(s) Oral four times a day PRN anxiety  haloperidol     Tablet 5 milliGRAM(s) Oral every 4 hours PRN agitation  haloperidol    Injectable 5 milliGRAM(s) IntraMuscular once PRN severe agitation  LORazepam     Tablet 2 milliGRAM(s) Oral every 4 hours PRN Agitation  LORazepam   Injectable 2 milliGRAM(s) IntraMuscular once PRN severe agitation  nicotine  Polacrilex Gum 4 milliGRAM(s) Oral every 3 hours PRN Smoking Cessation  
MEDICATIONS  (STANDING):  levothyroxine 88 MICROGram(s) Oral at bedtime  paliperidone ER 9 milliGRAM(s) Oral daily  paliperidone Injectable, Long Acting 156 milliGRAM(s) IntraMuscular once    MEDICATIONS  (PRN):  gabapentin 300 milliGRAM(s) Oral four times a day PRN anxiety  haloperidol     Tablet 5 milliGRAM(s) Oral every 4 hours PRN agitation  haloperidol    Injectable 5 milliGRAM(s) IntraMuscular once PRN severe agitation  nicotine  Polacrilex Gum 4 milliGRAM(s) Oral every 3 hours PRN Smoking Cessation  OLANZapine Injectable 10 milliGRAM(s) IntraMuscular once PRN severe agitation or aggression  
MEDICATIONS  (STANDING):  paliperidone ER 6 milliGRAM(s) Oral at bedtime    MEDICATIONS  (PRN):  gabapentin 300 milliGRAM(s) Oral four times a day PRN anxiety  haloperidol     Tablet 5 milliGRAM(s) Oral every 4 hours PRN agitation  haloperidol    Injectable 5 milliGRAM(s) IntraMuscular once PRN severe agitation  LORazepam     Tablet 2 milliGRAM(s) Oral every 4 hours PRN Agitation  LORazepam   Injectable 2 milliGRAM(s) IntraMuscular once PRN severe agitation  nicotine  Polacrilex Gum 4 milliGRAM(s) Oral every 3 hours PRN Smoking Cessation  
MEDICATIONS  (STANDING):  paliperidone ER 9 milliGRAM(s) Oral at bedtime    MEDICATIONS  (PRN):  gabapentin 300 milliGRAM(s) Oral four times a day PRN anxiety  haloperidol     Tablet 5 milliGRAM(s) Oral every 4 hours PRN agitation  haloperidol    Injectable 5 milliGRAM(s) IntraMuscular once PRN severe agitation  LORazepam     Tablet 2 milliGRAM(s) Oral every 4 hours PRN Agitation  LORazepam   Injectable 2 milliGRAM(s) IntraMuscular once PRN severe agitation  nicotine  Polacrilex Gum 4 milliGRAM(s) Oral every 3 hours PRN Smoking Cessation  OLANZapine Injectable 10 milliGRAM(s) IntraMuscular once PRN severe agitation or aggression  
MEDICATIONS  (STANDING):  paliperidone ER 9 milliGRAM(s) Oral daily    MEDICATIONS  (PRN):  gabapentin 300 milliGRAM(s) Oral four times a day PRN anxiety  haloperidol     Tablet 5 milliGRAM(s) Oral every 4 hours PRN agitation  haloperidol    Injectable 5 milliGRAM(s) IntraMuscular once PRN severe agitation  nicotine  Polacrilex Gum 4 milliGRAM(s) Oral every 3 hours PRN Smoking Cessation  OLANZapine Injectable 10 milliGRAM(s) IntraMuscular once PRN severe agitation or aggression  
MEDICATIONS  (STANDING):  paliperidone ER 6 milliGRAM(s) Oral at bedtime    MEDICATIONS  (PRN):  gabapentin 300 milliGRAM(s) Oral four times a day PRN anxiety  haloperidol     Tablet 5 milliGRAM(s) Oral every 4 hours PRN agitation  haloperidol    Injectable 5 milliGRAM(s) IntraMuscular once PRN severe agitation  LORazepam     Tablet 2 milliGRAM(s) Oral every 4 hours PRN Agitation  LORazepam   Injectable 2 milliGRAM(s) IntraMuscular once PRN severe agitation  nicotine  Polacrilex Gum 4 milliGRAM(s) Oral every 3 hours PRN Smoking Cessation  
MEDICATIONS  (STANDING):  paliperidone ER 6 milliGRAM(s) Oral at bedtime    MEDICATIONS  (PRN):  gabapentin 300 milliGRAM(s) Oral four times a day PRN anxiety  haloperidol     Tablet 5 milliGRAM(s) Oral every 4 hours PRN agitation  haloperidol    Injectable 5 milliGRAM(s) IntraMuscular once PRN severe agitation  LORazepam     Tablet 2 milliGRAM(s) Oral every 4 hours PRN Agitation  LORazepam   Injectable 2 milliGRAM(s) IntraMuscular once PRN severe agitation  nicotine  Polacrilex Gum 4 milliGRAM(s) Oral every 3 hours PRN Smoking Cessation  
MEDICATIONS  (STANDING):  paliperidone ER 9 milliGRAM(s) Oral at bedtime    MEDICATIONS  (PRN):  gabapentin 300 milliGRAM(s) Oral four times a day PRN anxiety  haloperidol     Tablet 5 milliGRAM(s) Oral every 4 hours PRN agitation  haloperidol    Injectable 5 milliGRAM(s) IntraMuscular once PRN severe agitation  LORazepam     Tablet 2 milliGRAM(s) Oral every 4 hours PRN Agitation  LORazepam   Injectable 2 milliGRAM(s) IntraMuscular once PRN severe agitation  nicotine  Polacrilex Gum 4 milliGRAM(s) Oral every 3 hours PRN Smoking Cessation  OLANZapine Injectable 10 milliGRAM(s) IntraMuscular once PRN severe agitation or aggression  
MEDICATIONS  (STANDING):  paliperidone ER 9 milliGRAM(s) Oral daily    MEDICATIONS  (PRN):  gabapentin 300 milliGRAM(s) Oral four times a day PRN anxiety  haloperidol     Tablet 5 milliGRAM(s) Oral every 4 hours PRN agitation  haloperidol    Injectable 5 milliGRAM(s) IntraMuscular once PRN severe agitation  nicotine  Polacrilex Gum 4 milliGRAM(s) Oral every 3 hours PRN Smoking Cessation  OLANZapine Injectable 10 milliGRAM(s) IntraMuscular once PRN severe agitation or aggression  
MEDICATIONS  (STANDING):  paliperidone ER 9 milliGRAM(s) Oral daily  paliperidone Injectable, Long Acting 156 milliGRAM(s) IntraMuscular once    MEDICATIONS  (PRN):  gabapentin 300 milliGRAM(s) Oral four times a day PRN anxiety  haloperidol     Tablet 5 milliGRAM(s) Oral every 4 hours PRN agitation  haloperidol    Injectable 5 milliGRAM(s) IntraMuscular once PRN severe agitation  nicotine  Polacrilex Gum 4 milliGRAM(s) Oral every 3 hours PRN Smoking Cessation  OLANZapine Injectable 10 milliGRAM(s) IntraMuscular once PRN severe agitation or aggression  
MEDICATIONS  (STANDING):    MEDICATIONS  (PRN):  gabapentin 300 milliGRAM(s) Oral four times a day PRN anxiety  haloperidol     Tablet 5 milliGRAM(s) Oral every 4 hours PRN agitation  haloperidol    Injectable 5 milliGRAM(s) IntraMuscular once PRN severe agitation  nicotine  Polacrilex Gum 4 milliGRAM(s) Oral every 3 hours PRN Smoking Cessation  OLANZapine Injectable 10 milliGRAM(s) IntraMuscular once PRN severe agitation or aggression  
MEDICATIONS  (STANDING):  levothyroxine 88 MICROGram(s) Oral daily  paliperidone ER 9 milliGRAM(s) Oral daily  paliperidone Injectable, Long Acting 156 milliGRAM(s) IntraMuscular once    MEDICATIONS  (PRN):  gabapentin 300 milliGRAM(s) Oral four times a day PRN anxiety  haloperidol     Tablet 5 milliGRAM(s) Oral every 4 hours PRN agitation  haloperidol    Injectable 5 milliGRAM(s) IntraMuscular once PRN severe agitation  nicotine  Polacrilex Gum 4 milliGRAM(s) Oral every 3 hours PRN Smoking Cessation  OLANZapine Injectable 10 milliGRAM(s) IntraMuscular once PRN severe agitation or aggression  
MEDICATIONS  (STANDING):  levothyroxine 88 MICROGram(s) Oral daily  paliperidone ER 9 milliGRAM(s) Oral daily  paliperidone Injectable, Long Acting 156 milliGRAM(s) IntraMuscular once    MEDICATIONS  (PRN):  gabapentin 300 milliGRAM(s) Oral four times a day PRN anxiety  haloperidol     Tablet 5 milliGRAM(s) Oral every 4 hours PRN agitation  haloperidol    Injectable 5 milliGRAM(s) IntraMuscular once PRN severe agitation  nicotine  Polacrilex Gum 4 milliGRAM(s) Oral every 3 hours PRN Smoking Cessation  OLANZapine Injectable 10 milliGRAM(s) IntraMuscular once PRN severe agitation or aggression  
MEDICATIONS  (STANDING):  levothyroxine 88 MICROGram(s) Oral at bedtime  paliperidone ER 9 milliGRAM(s) Oral daily  paliperidone Injectable, Long Acting 156 milliGRAM(s) IntraMuscular once    MEDICATIONS  (PRN):  gabapentin 300 milliGRAM(s) Oral four times a day PRN anxiety  haloperidol     Tablet 5 milliGRAM(s) Oral every 4 hours PRN agitation  haloperidol    Injectable 5 milliGRAM(s) IntraMuscular once PRN severe agitation  nicotine  Polacrilex Gum 4 milliGRAM(s) Oral every 3 hours PRN Smoking Cessation  OLANZapine Injectable 10 milliGRAM(s) IntraMuscular once PRN severe agitation or aggression  
MEDICATIONS  (STANDING):  paliperidone ER 6 milliGRAM(s) Oral at bedtime    MEDICATIONS  (PRN):  gabapentin 300 milliGRAM(s) Oral four times a day PRN anxiety  haloperidol     Tablet 5 milliGRAM(s) Oral every 4 hours PRN agitation  haloperidol    Injectable 5 milliGRAM(s) IntraMuscular once PRN severe agitation  LORazepam     Tablet 2 milliGRAM(s) Oral every 4 hours PRN Agitation  LORazepam   Injectable 2 milliGRAM(s) IntraMuscular once PRN severe agitation  nicotine  Polacrilex Gum 4 milliGRAM(s) Oral every 3 hours PRN Smoking Cessation  
MEDICATIONS  (STANDING):  paliperidone ER 9 milliGRAM(s) Oral at bedtime    MEDICATIONS  (PRN):  gabapentin 300 milliGRAM(s) Oral four times a day PRN anxiety  haloperidol     Tablet 5 milliGRAM(s) Oral every 4 hours PRN agitation  haloperidol    Injectable 5 milliGRAM(s) IntraMuscular once PRN severe agitation  LORazepam     Tablet 2 milliGRAM(s) Oral every 4 hours PRN Agitation  LORazepam   Injectable 2 milliGRAM(s) IntraMuscular once PRN severe agitation  nicotine  Polacrilex Gum 4 milliGRAM(s) Oral every 3 hours PRN Smoking Cessation  OLANZapine Injectable 10 milliGRAM(s) IntraMuscular once PRN severe agitation or aggression

## 2024-08-19 NOTE — BH INPATIENT PSYCHIATRY PROGRESS NOTE - NSTXSUBMISGOAL_PSY_ALL_CORE
Will verbalize 3 adverse consequences of use

## 2024-08-19 NOTE — BH INPATIENT PSYCHIATRY PROGRESS NOTE - NSTXSUPORTGOAL_PSY_ALL_CORE
Patient will engage in unit milieu
Patient will attend groups to promote social skill development
Patient will engage in unit milieu
Patient will attend groups to promote social skill development
Patient will engage in unit milieu
Patient will attend groups to promote social skill development
Patient will attend groups to promote social skill development
Patient will engage in unit milieu
Patient will attend groups to promote social skill development
Patient will engage in unit milieu
Patient will attend groups to promote social skill development
Patient will attend groups to promote social skill development
Patient will engage in unit milieu
Patient will attend groups to promote social skill development
Patient will engage in unit milieu
Patient will attend groups to promote social skill development

## 2024-08-19 NOTE — BH INPATIENT PSYCHIATRY PROGRESS NOTE - NSTXSUPORTPROGRES_PSY_ALL_CORE
No Change
Improving
No Change
Improving
No Change
Improving
No Change
Improving
No Change
Improving
Improving

## 2024-08-19 NOTE — BH INPATIENT PSYCHIATRY PROGRESS NOTE - NSTXSLPPATPROGRES_PSY_ALL_CORE
No Change
Improving
Improving
No Change
No Change
Improving
No Change
No Change
Improving
No Change
Improving
No Change
No Change
Improving
Improving
No Change
Improving
No Change
No Change
Improving

## 2024-08-19 NOTE — BH INPATIENT PSYCHIATRY PROGRESS NOTE - NSTXCOPEPROGRES_PSY_ALL_CORE
Improving
No Change
Improving
No Change
Improving
Improving
No Change
Improving
No Change

## 2024-08-19 NOTE — BH INPATIENT PSYCHIATRY PROGRESS NOTE - NSBHASSESSSUMMFT_PSY_ALL_CORE
Patient is a 44 year old male, single, domiciled in [place] with his mother, has a 22 y/o son, previously a  in [place], currently works making oils, unknown PPHx as patient denies previous diagnoses and hospitalizations, but may have a hx of schizophrenia per mother, not on medications, no prior suicide attempts, smokes cigars socially, denies other substances though Utox + for THC in the ED, denies PMHx, brought in by EMS, activated by patient's mother during an argument.  Pt presents with the delusion that a device is implanted in his head causing him to hear the voice of his cousin that at times predicts and controls his actions. Delusion is expanding to now include paranoia towards his cousin and mother, with whom he lives. Reports AH disrupts his sleep and has not gotten more than 4 hours/night in a long time. Pt has no insight into the need for treatment and denies all psychiatric history despite his mother's report that he has been diagnosed with schizophrenia. Pt requires psychiatric admission for safety and stabilization.     PLAN  9.39 admit  q15 adequate  Psych meds:  stop risperidone 2 qhs  start invega 3 qhs TUES and 6 qhs WED and attempt 9 qhs MON 7/29-- questionable compliance/?cheeking as pt remains paranoid;  rescheduled as qd for better monitoring with mouth checks  ANDERSON option for safety and compliance SUSTENNA  Consider addition of lithium, TBD  PRNs  Family collateral as per ED note and other  G&M therapy  Supportive therapy as tolerated  MOO likely needed, will initiate TUES 8/6  DISPO TBD
Patient is a 44 year old male, single, domiciled in [place] with his mother, has a 22 y/o son, previously a  in [place], currently works making oils, unknown PPHx as patient denies previous diagnoses and hospitalizations, but may have a hx of schizophrenia per mother, not on medications, no prior suicide attempts, smokes cigars socially, denies other substances though Utox + for THC in the ED, denies PMHx, brought in by EMS, activated by patient's mother during an argument.  Pt presents with the delusion that a device is implanted in his head causing him to hear the voice of his cousin that at times predicts and controls his actions. Delusion is expanding to now include paranoia towards his cousin and mother, with whom he lives. Reports AH disrupts his sleep and has not gotten more than 4 hours/night in a long time. Pt has no insight into the need for treatment and denies all psychiatric history despite his mother's report that he has been diagnosed with schizophrenia. Pt requires psychiatric admission for safety and stabilization.     PLAN  9.39 admit  q15 adequate  Psych meds:  stop risperidone 2 qhs  start invega 3 qhs TUES and 6 qhs WED and attempt 9 qhs MON 7/29-- questionable compliance/?cheeking as pt remains paranoid;  rescheduled as qd for better monitoring with mouth checks  ANDERSON option for safety and compliance SUSTENNA  Consider addition of lithium, TBD  PRNs  Family collateral as per ED note and other  G&M therapy  Supportive therapy as tolerated  MOO likely needed, will initiate TUES 8/6  DISPO TBD
Patient is a 44 year old male, single, domiciled in [place] with his mother, has a 20 y/o son, previously a  in [place], currently works making oils, unknown PPHx as patient denies previous diagnoses and hospitalizations, but may have a hx of schizophrenia per mother, not on medications, no prior suicide attempts, smokes cigars socially, denies other substances though Utox + for THC in the ED, denies PMHx, brought in by EMS, activated by patient's mother during an argument.  Pt presents with the delusion that a device is implanted in his head causing him to hear the voice of his cousin that at times predicts and controls his actions. Delusion is expanding to now include paranoia towards his cousin and mother, with whom he lives. Reports AH disrupts his sleep and has not gotten more than 4 hours/night in a long time. Pt has no insight into the need for treatment and denies all psychiatric history despite his mother's report that he has been diagnosed with schizophrenia. Pt requires psychiatric admission for safety and stabilization.     8/16 update: patient is preoccupied with complaint about numbeness and pressure around the right side of his face he thinks is related to jaw fx surgery.  States these symptoms have been intermittent for several months including most of his time time.  He wanted to attend follow up appt for this.  Continue with plan as per primary team below.  Will attempt to contact outpatient clinician regarding this complaint.     PLAN  9.39 admit  q15 adequate  Psych meds:  stop risperidone 2 qhs  start invega 3 qhs TUES and 6 qhs WED and attempt 9 qhs MON 7/29-- questionable compliance/?cheeking as pt remains paranoid;  rescheduled as qd for better monitoring with mouth checks  Send levels THURS PM to assess compliance  ANDERSON option for safety and compliance SUSTENNA  Consider addition of lithium, TBD  PRNs  Family collateral as per ED note and other  G&M therapy  Supportive therapy as tolerated  MOO likely needed, will initiate TUES 8/6  DISPO TBD
Patient is a 44 year old male, single, domiciled in [place] with his mother, has a 22 y/o son, previously a  in [place], currently works making oils, unknown PPHx as patient denies previous diagnoses and hospitalizations, but may have a hx of schizophrenia per mother, not on medications, no prior suicide attempts, smokes cigars socially, denies other substances though Utox + for THC in the ED, denies PMHx, brought in by EMS, activated by patient's mother during an argument.  Pt presents with the delusion that a device is implanted in his head causing him to hear the voice of his cousin that at times predicts and controls his actions. Delusion is expanding to now include paranoia towards his cousin and mother, with whom he lives. Reports AH disrupts his sleep and has not gotten more than 4 hours/night in a long time. Pt has no insight into the need for treatment and denies all psychiatric history despite his mother's report that he has been diagnosed with schizophrenia. Pt requires psychiatric admission for safety and stabilization.     PLAN  9.39 admit  q15 adequate  Psych meds:  stop risperidone 2 qhs  start invega 3 qhs TUES and 6 qhs WED;  ANDERSON option for safety and compliance  Consider addition of lithium, TBD  PRNs  Family collateral as per ED note and other  G&M therapy  Supportive therapy as tolerated  DISPO TBD
Patient is a 44 year old male, single, domiciled in [place] with his mother, has a 22 y/o son, previously a  in [place], currently works making oils, unknown PPHx as patient denies previous diagnoses and hospitalizations, but may have a hx of schizophrenia per mother, not on medications, no prior suicide attempts, smokes cigars socially, denies other substances though Utox + for THC in the ED, denies PMHx, brought in by EMS, activated by patient's mother during an argument.  Pt presents with the delusion that a device is implanted in his head causing him to hear the voice of his cousin that at times predicts and controls his actions. Delusion is expanding to now include paranoia towards his cousin and mother, with whom he lives. Reports AH disrupts his sleep and has not gotten more than 4 hours/night in a long time. Pt has no insight into the need for treatment and denies all psychiatric history despite his mother's report that he has been diagnosed with schizophrenia. Pt requires psychiatric admission for safety and stabilization.     PLAN  9.39 admit  q15 adequate  Psych meds:  stop risperidone 2 qhs  start invega 3 qhs TUES and 6 qhs WED and attempt 9 qhs MON 7/29-- questionable compliance/?cheeking as pt remains paranoid;  rescheduled as qd for better monitoring with mouth checks  ANDERSON option for safety and compliance SUSTENNA  Consider addition of lithium, TBD  PRNs  Family collateral as per ED note and other  G&M therapy  Supportive therapy as tolerated  MOO likely needed, will initiate TUES 8/6  DISPO TBD
Patient is a 44 year old male, single, domiciled in [place] with his mother, has a 22 y/o son, previously a  in [place], currently works making oils, unknown PPHx as patient denies previous diagnoses and hospitalizations, but may have a hx of schizophrenia per mother, not on medications, no prior suicide attempts, smokes cigars socially, denies other substances though Utox + for THC in the ED, denies PMHx, brought in by EMS, activated by patient's mother during an argument.  Pt presents with the delusion that a device is implanted in his head causing him to hear the voice of his cousin that at times predicts and controls his actions. Delusion is expanding to now include paranoia towards his cousin and mother, with whom he lives. Reports AH disrupts his sleep and has not gotten more than 4 hours/night in a long time. Pt has no insight into the need for treatment and denies all psychiatric history despite his mother's report that he has been diagnosed with schizophrenia. Pt requires psychiatric admission for safety and stabilization.     PLAN  9.39 admit  q15 adequate  Psych meds:  stop risperidone 2 qhs  start invega 3 qhs TUES and 6 qhs WED and attempt 9 qhs MON 7/29-- questionable compliance/?cheeking as pt remains paranoid;  rescheduled as qd for better monitoring with mouth checks  Send levels THURS PM to assess compliance  ANDERSON option for safety and compliance SUSTENNA  Consider addition of lithium, TBD  PRNs  Family collateral as per ED note and other  G&M therapy  Supportive therapy as tolerated  MOO likely needed, will initiate TUES 8/6  DISPO TBD
Patient is a 44 year old male, single, domiciled in [place] with his mother, has a 22 y/o son, previously a  in [place], currently works making oils, unknown PPHx as patient denies previous diagnoses and hospitalizations, but may have a hx of schizophrenia per mother, not on medications, no prior suicide attempts, smokes cigars socially, denies other substances though Utox + for THC in the ED, denies PMHx, brought in by EMS, activated by patient's mother during an argument.  Pt presents with the delusion that a device is implanted in his head causing him to hear the voice of his cousin that at times predicts and controls his actions. Delusion is expanding to now include paranoia towards his cousin and mother, with whom he lives. Reports AH disrupts his sleep and has not gotten more than 4 hours/night in a long time. Pt has no insight into the need for treatment and denies all psychiatric history despite his mother's report that he has been diagnosed with schizophrenia. Pt requires psychiatric admission for safety and stabilization.     PLAN  9.39 admit  q15 adequate  Psych meds:  stop risperidone 2 qhs  start invega 3 qhs TUES and 6 qhs WED and attempt 9 qhs after 5-6 days;  ANDERSON option for safety and compliance  Consider addition of lithium, TBD  PRNs  Family collateral as per ED note and other  G&M therapy  Supportive therapy as tolerated  DISPO TBD
Patient is a 44 year old male, single, domiciled in [place] with his mother, has a 22 y/o son, previously a  in [place], currently works making oils, unknown PPHx as patient denies previous diagnoses and hospitalizations, but may have a hx of schizophrenia per mother, not on medications, no prior suicide attempts, smokes cigars socially, denies other substances though Utox + for THC in the ED, denies PMHx, brought in by EMS, activated by patient's mother during an argument.  Pt presents with the delusion that a device is implanted in his head causing him to hear the voice of his cousin that at times predicts and controls his actions. Delusion is expanding to now include paranoia towards his cousin and mother, with whom he lives. Reports AH disrupts his sleep and has not gotten more than 4 hours/night in a long time. Pt has no insight into the need for treatment and denies all psychiatric history despite his mother's report that he has been diagnosed with schizophrenia. Pt requires psychiatric admission for safety and stabilization.     On assessment, patient denies any psychosis but delusions and AH suspected.    PLAN  9.39 admit  q15 adequate  Psych meds:  stop risperidone 2 qhs  start invega 3 qhs TUES and 6 qhs WED and attempt 9 qhs after 5-6 days;  ANDERSON option for safety and compliance  Consider addition of lithium, TBD  PRNs  Family collateral as per ED note and other  G&M therapy  Supportive therapy as tolerated  DISPO TBD
Patient is a 44 year old male, single, domiciled in [place] with his mother, has a 22 y/o son, previously a  in [place], currently works making oils, unknown PPHx as patient denies previous diagnoses and hospitalizations, but may have a hx of schizophrenia per mother, not on medications, no prior suicide attempts, smokes cigars socially, denies other substances though Utox + for THC in the ED, denies PMHx, brought in by EMS, activated by patient's mother during an argument.  Pt presents with the delusion that a device is implanted in his head causing him to hear the voice of his cousin that at times predicts and controls his actions. Delusion is expanding to now include paranoia towards his cousin and mother, with whom he lives. Reports AH disrupts his sleep and has not gotten more than 4 hours/night in a long time. Pt has no insight into the need for treatment and denies all psychiatric history despite his mother's report that he has been diagnosed with schizophrenia. Pt requires psychiatric admission for safety and stabilization.     PLAN  9.39 admit  q15 adequate  Psych meds:  stop risperidone 2 qhs  start invega 3 qhs TUES and 6 qhs WED and attempt 9 qhs tonight MON 7/29-- questionable compliance/noted to be cheeking;  rescheduled as qd for better monitoring with mouth checks  ANDERSON option for safety and compliance SUSTENNA  Consider addition of lithium, TBD  PRNs  Family collateral as per ED note and other  G&M therapy  Supportive therapy as tolerated  MOO likely needed, will initiate TUES 8/6  DISPO TBD
Patient is a 44 year old male, single, domiciled in [place] with his mother, has a 20 y/o son, previously a  in [place], currently works making oils, unknown PPHx as patient denies previous diagnoses and hospitalizations, but may have a hx of schizophrenia per mother, not on medications, no prior suicide attempts, smokes cigars socially, denies other substances though Utox + for THC in the ED, denies PMHx, brought in by EMS, activated by patient's mother during an argument.  Pt presents with the delusion that a device is implanted in his head causing him to hear the voice of his cousin that at times predicts and controls his actions. Delusion is expanding to now include paranoia towards his cousin and mother, with whom he lives. Reports AH disrupts his sleep and has not gotten more than 4 hours/night in a long time. Pt has no insight into the need for treatment and denies all psychiatric history despite his mother's report that he has been diagnosed with schizophrenia. Pt requires psychiatric admission for safety and stabilization.     PLAN  9.39 admit  q15 adequate  Psych meds:  stop risperidone 2 qhs  start invega 3 qhs TUES and 6 qhs WED and attempt 9 qhs tonight MON 7/29-- questionable compliance  ANDERSON option for safety and compliance SUSTENNA  Consider addition of lithium, TBD  PRNs  Family collateral as per ED note and other  G&M therapy  Supportive therapy as tolerated  DISPO TBD
Patient is a 44 year old male, single, domiciled in [place] with his mother, has a 20 y/o son, previously a  in [place], currently works making oils, unknown PPHx as patient denies previous diagnoses and hospitalizations, but may have a hx of schizophrenia per mother, not on medications, no prior suicide attempts, smokes cigars socially, denies other substances though Utox + for THC in the ED, denies PMHx, brought in by EMS, activated by patient's mother during an argument.  Pt presents with the delusion that a device is implanted in his head causing him to hear the voice of his cousin that at times predicts and controls his actions. Delusion is expanding to now include paranoia towards his cousin and mother, with whom he lives. Reports AH disrupts his sleep and has not gotten more than 4 hours/night in a long time. Pt has no insight into the need for treatment and denies all psychiatric history despite his mother's report that he has been diagnosed with schizophrenia. Pt requires psychiatric admission for safety and stabilization.     PLAN  9.39 admit  q15 adequate  Psych meds:  stop risperidone 2 qhs  start invega 3 qhs TUES and 6 qhs WED and attempt 9 qhs MON 7/29-- lower to 3 mg qhs after ANDERSON loading and given 3 mg x10 days at discharge  ANDERSON loading started as 156 with plan for 117 mg given mild elevation in Cr/pharmacy consult  invega blood levels  Consider addition of lithium, deferred given improvements  PRNs  Family collateral as per ED note and other  G&M therapy  Supportive therapy as tolerated  MOO not needed  DISPO aftercare with ANDERSON
Patient is a 44 year old male, single, domiciled in [place] with his mother, has a 22 y/o son, previously a  in [place], currently works making oils, unknown PPHx as patient denies previous diagnoses and hospitalizations, but may have a hx of schizophrenia per mother, not on medications, no prior suicide attempts, smokes cigars socially, denies other substances though Utox + for THC in the ED, denies PMHx, brought in by EMS, activated by patient's mother during an argument.  Pt presents with the delusion that a device is implanted in his head causing him to hear the voice of his cousin that at times predicts and controls his actions. Delusion is expanding to now include paranoia towards his cousin and mother, with whom he lives. Reports AH disrupts his sleep and has not gotten more than 4 hours/night in a long time. Pt has no insight into the need for treatment and denies all psychiatric history despite his mother's report that he has been diagnosed with schizophrenia. Pt requires psychiatric admission for safety and stabilization.     PLAN  9.39 admit  q15 adequate  Psych meds:  stop risperidone 2 qhs  start invega 3 qhs TUES and 6 qhs WED and attempt 9 qhs MON 7/29-- questionable compliance/?cheeking as pt remains paranoid;  rescheduled as qd for better monitoring with mouth checks  ANDERSON option for safety and compliance SUSTENNA  Consider addition of lithium, TBD  PRNs  Family collateral as per ED note and other  G&M therapy  Supportive therapy as tolerated  MOO likely needed, will initiate TUES 8/6  DISPO TBD
Patient is a 44 year old male, single, domiciled in [place] with his mother, has a 20 y/o son, previously a  in [place], currently works making oils, unknown PPHx as patient denies previous diagnoses and hospitalizations, but may have a hx of schizophrenia per mother, not on medications, no prior suicide attempts, smokes cigars socially, denies other substances though Utox + for THC in the ED, denies PMHx, brought in by EMS, activated by patient's mother during an argument.  Pt presents with the delusion that a device is implanted in his head causing him to hear the voice of his cousin that at times predicts and controls his actions. Delusion is expanding to now include paranoia towards his cousin and mother, with whom he lives. Reports AH disrupts his sleep and has not gotten more than 4 hours/night in a long time. Pt has no insight into the need for treatment and denies all psychiatric history despite his mother's report that he has been diagnosed with schizophrenia. Pt requires psychiatric admission for safety and stabilization.     PLAN  9.39 admit  q15 adequate  Psych meds:  stop risperidone 2 qhs  start invega 3 qhs TUES and 6 qhs WED and attempt 9 qhs MON 7/29-- questionable compliance/?cheeking as pt remains paranoid;  rescheduled as qd for better monitoring with mouth checks  ANDERSON option for safety and compliance SUSTENNA  Consider addition of lithium, TBD  PRNs  Family collateral as per ED note and other  G&M therapy  Supportive therapy as tolerated  MOO likely needed, will initiate TUES 8/6  DISPO TBD
Patient is a 44 year old male, single, domiciled in [place] with his mother, has a 22 y/o son, previously a  in [place], currently works making oils, unknown PPHx as patient denies previous diagnoses and hospitalizations, but may have a hx of schizophrenia per mother, not on medications, no prior suicide attempts, smokes cigars socially, denies other substances though Utox + for THC in the ED, denies PMHx, brought in by EMS, activated by patient's mother during an argument.  Pt presents with the delusion that a device is implanted in his head causing him to hear the voice of his cousin that at times predicts and controls his actions. Delusion is expanding to now include paranoia towards his cousin and mother, with whom he lives. Reports AH disrupts his sleep and has not gotten more than 4 hours/night in a long time. Pt has no insight into the need for treatment and denies all psychiatric history despite his mother's report that he has been diagnosed with schizophrenia. Pt requires psychiatric admission for safety and stabilization.     PLAN  9.39 admit  q15 adequate  Psych meds:  stop risperidone 2 qhs  start invega 3 qhs TUES and 6 qhs WED and attempt 9 qhs MON 7/29-- questionable compliance/?cheeking as pt remains paranoid;  rescheduled as qd for better monitoring with mouth checks  ANDERSON option for safety and compliance SUSTENNA  Consider addition of lithium, TBD  PRNs  Family collateral as per ED note and other  G&M therapy  Supportive therapy as tolerated  MOO likely needed, will initiate TUES 8/6  DISPO TBD
Patient is a 44 year old male, single, domiciled in [place] with his mother, has a 20 y/o son, previously a  in [place], currently works making oils, unknown PPHx as patient denies previous diagnoses and hospitalizations, but may have a hx of schizophrenia per mother, not on medications, no prior suicide attempts, smokes cigars socially, denies other substances though Utox + for THC in the ED, denies PMHx, brought in by EMS, activated by patient's mother during an argument.  Pt presents with the delusion that a device is implanted in his head causing him to hear the voice of his cousin that at times predicts and controls his actions. Delusion is expanding to now include paranoia towards his cousin and mother, with whom he lives. Reports AH disrupts his sleep and has not gotten more than 4 hours/night in a long time. Pt has no insight into the need for treatment and denies all psychiatric history despite his mother's report that he has been diagnosed with schizophrenia. Pt requires psychiatric admission for safety and stabilization.     PLAN  9.39 admit  q15 adequate  Psych meds:  stop risperidone 2 qhs  start invega 3 qhs TUES and 6 qhs WED and attempt 9 qhs MON 7/29-- questionable compliance/?cheeking as pt remains paranoid;  rescheduled as qd for better monitoring with mouth checks  Send levels THURS PM to assess compliance  ANDERSON option for safety and compliance SUSTENNA  Consider addition of lithium, TBD  PRNs  Family collateral as per ED note and other  G&M therapy  Supportive therapy as tolerated  MOO likely needed, will initiate TUES 8/6  DISPO TBD
Patient is a 44 year old male, single, domiciled in [place] with his mother, has a 20 y/o son, previously a  in [place], currently works making oils, unknown PPHx as patient denies previous diagnoses and hospitalizations, but may have a hx of schizophrenia per mother, not on medications, no prior suicide attempts, smokes cigars socially, denies other substances though Utox + for THC in the ED, denies PMHx, brought in by EMS, activated by patient's mother during an argument.  Pt presents with the delusion that a device is implanted in his head causing him to hear the voice of his cousin that at times predicts and controls his actions. Delusion is expanding to now include paranoia towards his cousin and mother, with whom he lives. Reports AH disrupts his sleep and has not gotten more than 4 hours/night in a long time. Pt has no insight into the need for treatment and denies all psychiatric history despite his mother's report that he has been diagnosed with schizophrenia. Pt requires psychiatric admission for safety and stabilization.     PLAN  9.39 admit  q15 adequate  Psych meds:  stop risperidone 2 qhs  start invega 3 qhs TUES and 6 qhs WED and attempt 9 qhs MON 7/29-- questionable compliance/?cheeking as pt remains paranoid;  rescheduled as qd for better monitoring with mouth checks  ANDERSON option for safety and compliance SUSTENNA  Consider addition of lithium, TBD  PRNs  Family collateral as per ED note and other  G&M therapy  Supportive therapy as tolerated  MOO likely needed, will initiate TUES 8/6  DISPO TBD
Patient is a 44 year old male, single, domiciled in [place] with his mother, has a 20 y/o son, previously a  in [place], currently works making oils, unknown PPHx as patient denies previous diagnoses and hospitalizations, but may have a hx of schizophrenia per mother, not on medications, no prior suicide attempts, smokes cigars socially, denies other substances though Utox + for THC in the ED, denies PMHx, brought in by EMS, activated by patient's mother during an argument.  Pt presents with the delusion that a device is implanted in his head causing him to hear the voice of his cousin that at times predicts and controls his actions. Delusion is expanding to now include paranoia towards his cousin and mother, with whom he lives. Reports AH disrupts his sleep and has not gotten more than 4 hours/night in a long time. Pt has no insight into the need for treatment and denies all psychiatric history despite his mother's report that he has been diagnosed with schizophrenia. Pt requires psychiatric admission for safety and stabilization.     PLAN  9.39 admit  q15 adequate  Psych meds:  stop risperidone 2 qhs  start invega 3 qhs TUES and 6 qhs WED and attempt 9 qhs after MON;  ANDERSON option for safety and compliance  Consider addition of lithium, TBD  PRNs  Family collateral as per ED note and other  G&M therapy  Supportive therapy as tolerated  DISPO TBD
Patient is a 44 year old male, single, domiciled in [place] with his mother, has a 22 y/o son, previously a  in [place], currently works making oils, unknown PPHx as patient denies previous diagnoses and hospitalizations, but may have a hx of schizophrenia per mother, not on medications, no prior suicide attempts, smokes cigars socially, denies other substances though Utox + for THC in the ED, denies PMHx, brought in by EMS, activated by patient's mother during an argument.  Pt presents with the delusion that a device is implanted in his head causing him to hear the voice of his cousin that at times predicts and controls his actions. Delusion is expanding to now include paranoia towards his cousin and mother, with whom he lives. Reports AH disrupts his sleep and has not gotten more than 4 hours/night in a long time. Pt has no insight into the need for treatment and denies all psychiatric history despite his mother's report that he has been diagnosed with schizophrenia. Pt requires psychiatric admission for safety and stabilization.     PLAN  9.39 admit  q15 adequate  Psych meds:  stop risperidone 2 qhs  start invega 3 qhs TUES and 6 qhs WED and attempt 9 qhs MON 7/29-- questionable compliance/?cheeking as pt remains paranoid;  rescheduled as qd for better monitoring with mouth checks  ANDERSON option for safety and compliance SUSTENNA  Consider addition of lithium, TBD  PRNs  Family collateral as per ED note and other  G&M therapy  Supportive therapy as tolerated  MOO likely needed, will initiate TUES 8/6  DISPO TBD
Patient is a 44 year old male, single, domiciled in [place] with his mother, has a 20 y/o son, previously a  in [place], currently works making oils, unknown PPHx as patient denies previous diagnoses and hospitalizations, but may have a hx of schizophrenia per mother, not on medications, no prior suicide attempts, smokes cigars socially, denies other substances though Utox + for THC in the ED, denies PMHx, brought in by EMS, activated by patient's mother during an argument.  Pt presents with the delusion that a device is implanted in his head causing him to hear the voice of his cousin that at times predicts and controls his actions. Delusion is expanding to now include paranoia towards his cousin and mother, with whom he lives. Reports AH disrupts his sleep and has not gotten more than 4 hours/night in a long time. Pt has no insight into the need for treatment and denies all psychiatric history despite his mother's report that he has been diagnosed with schizophrenia. Pt requires psychiatric admission for safety and stabilization.     PLAN  9.39 admit  q15 adequate  Psych meds:  stop risperidone 2 qhs  start invega 3 qhs TUES and 6 qhs WED and attempt 9 qhs tonight MON 7/29  ANDERSON option for safety and compliance SUSTENNA  Consider addition of lithium, TBD  PRNs  Family collateral as per ED note and other  G&M therapy  Supportive therapy as tolerated  DISPO TBD
Patient is a 44 year old male, single, domiciled in [place] with his mother, has a 22 y/o son, previously a  in [place], currently works making oils, unknown PPHx as patient denies previous diagnoses and hospitalizations, but may have a hx of schizophrenia per mother, not on medications, no prior suicide attempts, smokes cigars socially, denies other substances though Utox + for THC in the ED, denies PMHx, brought in by EMS, activated by patient's mother during an argument.  Pt presents with the delusion that a device is implanted in his head causing him to hear the voice of his cousin that at times predicts and controls his actions. Delusion is expanding to now include paranoia towards his cousin and mother, with whom he lives. Reports AH disrupts his sleep and has not gotten more than 4 hours/night in a long time. Pt has no insight into the need for treatment and denies all psychiatric history despite his mother's report that he has been diagnosed with schizophrenia. Pt requires psychiatric admission for safety and stabilization.     PLAN  9.39 admit  q15 adequate  Psych meds:  stop risperidone 2 qhs  start invega 3 qhs TUES and 6 qhs WED and attempt 9 qhs tonight MON 7/29  ANDERSON option for safety and compliance SUSTENNA  Consider addition of lithium, TBD  PRNs  Family collateral as per ED note and other  G&M therapy  Supportive therapy as tolerated  DISPO TBD
Patient is a 44 year old male, single, domiciled in [place] with his mother, has a 20 y/o son, previously a  in [place], currently works making oils, unknown PPHx as patient denies previous diagnoses and hospitalizations, but may have a hx of schizophrenia per mother, not on medications, no prior suicide attempts, smokes cigars socially, denies other substances though Utox + for THC in the ED, denies PMHx, brought in by EMS, activated by patient's mother during an argument.  Pt presents with the delusion that a device is implanted in his head causing him to hear the voice of his cousin that at times predicts and controls his actions. Delusion is expanding to now include paranoia towards his cousin and mother, with whom he lives. Reports AH disrupts his sleep and has not gotten more than 4 hours/night in a long time. Pt has no insight into the need for treatment and denies all psychiatric history despite his mother's report that he has been diagnosed with schizophrenia. Pt requires psychiatric admission for safety and stabilization.     PLAN  9.39 admit  q15 adequate  Psych meds:  stop risperidone 2 qhs  start invega 3 qhs TUES and 6 qhs WED and attempt 9 qhs after 5-6 days;  ANDERSON option for safety and compliance  Consider addition of lithium, TBD  PRNs  Family collateral as per ED note and other  G&M therapy  Supportive therapy as tolerated  DISPO TBD

## 2024-08-19 NOTE — BH INPATIENT PSYCHIATRY PROGRESS NOTE - MSE UNSTRUCTURED FT
Appearance: appears stated age, casual clothing; good hygiene and grooming  Behavior: cooperative. Well related.  Distressed by jaw sx  Neuro: CN VII intact, CN XII intact with tongue movements; reports loss of feeling to light touch to patchy regions on right side of face adjacent to lips  Eye contact: fair  Motor: No abnormal movements, no psychomotor slowing or activation.  Speech: fluent, normal rate, volume, prosody  Mood: "okay"   Affect: anxious, irritable, blunted  Thought Process: linear  Thought Content: unremarkable, no delusions  Perceptual disturbance: no evidence of perceptual disturbance  Insight: fair  Judgment: emerging  Impulse control:  good  Cognition grossly intact.   Language: Fluent.  Gait: Intact. 
Appearance: appears stated age, casual clothing; adequate hygiene and grooming  Behavior: more cooperative, adequately related, complains of some jaw numbness, variable but now improved rapport with his MD  Neuro: CN VII intact, CN XII intact with tongue movements; reports loss of feeling to light touch to patchy regions on right side of face adjacent to lips  Eye contact: fair  Motor: No abnormal movements, no psychomotor slowing or activation.  Speech: fluent, normal rate, volume, prosody  Mood: "okay"   Affect: less anxious, irritable, blunted but more range evident, appropriate, worried at times  Thought Process: linear but guarded  Thought Content: unremarkable, no delusions prominent, notably fewer paranoid themes  Perceptual disturbance: no evidence of perceptual disturbance  Insight: fair on some topics  Judgment: emerging  Impulse control:  good  Cognition grossly intact.   Language: Fluent  Gait: Intact.   Reliability: fair on some topics  VSS

## 2024-08-19 NOTE — BH INPATIENT PSYCHIATRY PROGRESS NOTE - NSBHATTESTBILLING_PSY_A_CORE
16968-Gwovccjbal OBS or IP - moderate complexity OR 35-49 mins
49083-Xgrclxpwjw OBS or IP - high complexity OR 50-79 mins
32784-Uetbrxbmhx OBS or IP - high complexity OR 50-79 mins
16643-Wexphliqmt OBS or IP - moderate complexity OR 35-49 mins
47899-Fshfbgqtfs OBS or IP - moderate complexity OR 35-49 mins
18628-Nycwodczcx OBS or IP - moderate complexity OR 35-49 mins
32696-Xtfakugfvx OBS or IP - moderate complexity OR 35-49 mins
75423-Rmkcepptue OBS or IP - moderate complexity OR 35-49 mins
54730-Zmkzlhdckk OBS or IP - moderate complexity OR 35-49 mins
35020-Jupkcrjraq OBS or IP - moderate complexity OR 35-49 mins
24955-Gougwioaqx OBS or IP - moderate complexity OR 35-49 mins
76255-Fkncmtgmvq OBS or IP - moderate complexity OR 35-49 mins
59924-Kuvafewcul OBS or IP - moderate complexity OR 35-49 mins
66835-Ixnllxyqou OBS or IP - moderate complexity OR 35-49 mins
75212-Rgvgfomfnd OBS or IP - moderate complexity OR 35-49 mins
33971-Zxvqlkkqia OBS or IP - low complexity OR 25-34 mins
44761-Dtinawojbr OBS or IP - high complexity OR 50-79 mins
27107-Kgqhufsqcy OBS or IP - high complexity OR 50-79 mins
86357-Adgsrcywzp OBS or IP - moderate complexity OR 35-49 mins
27596-Gjxpktjeog OBS or IP - moderate complexity OR 35-49 mins
91080-Vbxvsvdexl OBS or IP - moderate complexity OR 35-49 mins

## 2024-08-19 NOTE — BH INPATIENT PSYCHIATRY PROGRESS NOTE - NSTXSLPPATDATETRGT_PSY_ALL_CORE
01-Aug-2024
08-Aug-2024
01-Aug-2024
08-Aug-2024
01-Aug-2024
08-Aug-2024
01-Aug-2024
08-Aug-2024
08-Aug-2024
01-Aug-2024

## 2024-08-19 NOTE — BH INPATIENT PSYCHIATRY PROGRESS NOTE - NSTXTOBACOGOAL_PSY_ALL_CORE
Will accept nicotine replacement therapy

## 2024-08-19 NOTE — BH INPATIENT PSYCHIATRY PROGRESS NOTE - NSTXPSYCHODATETRGT_PSY_ALL_CORE
01-Aug-2024
08-Aug-2024
01-Aug-2024
08-Aug-2024
01-Aug-2024
08-Aug-2024
01-Aug-2024
08-Aug-2024
01-Aug-2024
08-Aug-2024
01-Aug-2024
08-Aug-2024
01-Aug-2024
08-Aug-2024

## 2024-08-19 NOTE — BH INPATIENT PSYCHIATRY PROGRESS NOTE - NSTXSUBMISPROGRES_PSY_ALL_CORE
Improving
No Change
No Change
Improving
No Change
Improving
No Change
Improving
Improving
No Change
Improving
No Change
Improving
No Change

## 2024-08-19 NOTE — BH INPATIENT PSYCHIATRY PROGRESS NOTE - NSTXSUBMISDATETRGT_PSY_ALL_CORE
01-Aug-2024
08-Aug-2024
01-Aug-2024
08-Aug-2024
01-Aug-2024
08-Aug-2024

## 2024-08-19 NOTE — BH INPATIENT PSYCHIATRY PROGRESS NOTE - NSTXPROBANX_PSY_ALL_CORE
Krista Bryant  MRN:  [id number]   :  1988  DOS:  23      PREOPERATIVE DIAGNOSIS:    1. Right nephrolithiasis    POSTOPERATIVE DIAGNOSIS:    1. Right nephrolithiasis    PROCEDURES  1. Right extracorporeal shockwave lithotripsy  2. Interpretation of intraoperative fluoroscopic imaging     SURGEON:  Apolinar Curtis MD    ANESTHESIOLOGIST:  HAI: Cristian Messina CRNA    ANESTHESIA:  General with endotracheal intubation    ESTIMATED BLOOD LOSS:  0 mL    SPECIMENS:  None    FINDINGS:  Excellent fragmentation of Right renal calculi    OPERATIVE INDICATION:  Krista Bryant is a 33 year old female with a history of nephrolithiasis.  After discussing treatment options including observation, ureteroscopy, PCNL, and shockwave lithotripsy she elected to proceed with ESWL.  She understands that more than one surgery may be needed for complete stone clearance.      OPERATIVE DETAILS:  After informed consent was obtained, Krista was taken to the operating room.  Anesthesia was induced and the patient was intubated while on the gurney.  She was carefully transferred to the lithotripter table in the supine position.  Biplanar fluoroscopy was used to identify the stone.  We began with 200 shocks at 4 kV in a non-gated fashion followed by a 2 minute pause.  No arrhythmias or ectopy were observed.  Next, 400 shocks were delivered starting at 4kV and ramping up to 7kV.  Intermittent biplanar fluoroscopy was used to monitor fragmentation of the stone.  A total of 3000 shocks was delivered with good fragmentation of the calculus.  The patient tolerated the procedure well.  She was awakened from anesthesia, extubated, and transferred to PACU in stable condition.    Krista tolerated the procedure well.  There were no assistants, complications, drains or implants.      Apolinar Curtis MD  Urologic Surgery   
ANXIETY/PANIC/FEAR

## 2024-08-19 NOTE — BH INPATIENT PSYCHIATRY PROGRESS NOTE - NSTXSLPPATGOAL_PSY_ALL_CORE
Be able to sleep for a minimum of six hours daily

## 2024-08-19 NOTE — BH INPATIENT PSYCHIATRY PROGRESS NOTE - NSBHFUPINTERVALCCFT_PSY_A_CORE
F/u for AH/psychosis; SCZ vs BAD
Patient seen for follow up for psychosis and cannabis use disorder
Patient seen for follow up for psychosis. 
Patient seen for follow up for psychosis and cannabis use disorder
F/u for AH/psychosis; SCZ vs BAD
Patient seen for follow up for psychosis and cannabis use disorder
Patient seen for follow up for psychosis. 
Patient seen for follow up for psychosis and cannabis use disorder
F/u for AH/psychosis; SCZ vs BAD
Patient seen for follow up for psychosis and cannabis use disorder
Patient seen for follow up for psychosis and cannabis use disorder

## 2024-08-19 NOTE — BH INPATIENT PSYCHIATRY PROGRESS NOTE - PRN MEDS
MEDICATIONS  (PRN):  gabapentin 300 milliGRAM(s) Oral four times a day PRN anxiety  haloperidol     Tablet 5 milliGRAM(s) Oral every 4 hours PRN agitation  haloperidol    Injectable 5 milliGRAM(s) IntraMuscular once PRN severe agitation  nicotine  Polacrilex Gum 4 milliGRAM(s) Oral every 3 hours PRN Smoking Cessation  OLANZapine Injectable 10 milliGRAM(s) IntraMuscular once PRN severe agitation or aggression  
MEDICATIONS  (PRN):  gabapentin 300 milliGRAM(s) Oral four times a day PRN anxiety  haloperidol     Tablet 5 milliGRAM(s) Oral every 4 hours PRN agitation  haloperidol    Injectable 5 milliGRAM(s) IntraMuscular once PRN severe agitation  nicotine  Polacrilex Gum 4 milliGRAM(s) Oral every 3 hours PRN Smoking Cessation  OLANZapine Injectable 10 milliGRAM(s) IntraMuscular once PRN severe agitation or aggression  
MEDICATIONS  (PRN):  gabapentin 300 milliGRAM(s) Oral four times a day PRN anxiety  haloperidol     Tablet 5 milliGRAM(s) Oral every 4 hours PRN agitation  haloperidol    Injectable 5 milliGRAM(s) IntraMuscular once PRN severe agitation  LORazepam     Tablet 2 milliGRAM(s) Oral every 4 hours PRN Agitation  LORazepam   Injectable 2 milliGRAM(s) IntraMuscular once PRN severe agitation  nicotine  Polacrilex Gum 4 milliGRAM(s) Oral every 3 hours PRN Smoking Cessation  OLANZapine Injectable 10 milliGRAM(s) IntraMuscular once PRN severe agitation or aggression  
MEDICATIONS  (PRN):  acetaminophen     Tablet .. 650 milliGRAM(s) Oral every 6 hours PRN Mild Pain (1 - 3), Moderate Pain (4 - 6)  gabapentin 300 milliGRAM(s) Oral four times a day PRN anxiety  haloperidol     Tablet 5 milliGRAM(s) Oral every 4 hours PRN agitation  haloperidol    Injectable 5 milliGRAM(s) IntraMuscular once PRN severe agitation  ibuprofen  Tablet. 400 milliGRAM(s) Oral every 4 hours PRN Mild Pain (1 - 3), Moderate Pain (4 - 6)  nicotine  Polacrilex Gum 4 milliGRAM(s) Oral every 3 hours PRN Smoking Cessation  OLANZapine Injectable 10 milliGRAM(s) IntraMuscular once PRN severe agitation or aggression  
MEDICATIONS  (PRN):  gabapentin 300 milliGRAM(s) Oral four times a day PRN anxiety  haloperidol     Tablet 5 milliGRAM(s) Oral every 4 hours PRN agitation  haloperidol    Injectable 5 milliGRAM(s) IntraMuscular once PRN severe agitation  LORazepam     Tablet 2 milliGRAM(s) Oral every 4 hours PRN Agitation  LORazepam   Injectable 2 milliGRAM(s) IntraMuscular once PRN severe agitation  nicotine  Polacrilex Gum 4 milliGRAM(s) Oral every 3 hours PRN Smoking Cessation  
MEDICATIONS  (PRN):  gabapentin 300 milliGRAM(s) Oral four times a day PRN anxiety  haloperidol     Tablet 5 milliGRAM(s) Oral every 4 hours PRN agitation  haloperidol    Injectable 5 milliGRAM(s) IntraMuscular once PRN severe agitation  LORazepam     Tablet 2 milliGRAM(s) Oral every 4 hours PRN Agitation  LORazepam   Injectable 2 milliGRAM(s) IntraMuscular once PRN severe agitation  nicotine  Polacrilex Gum 4 milliGRAM(s) Oral every 3 hours PRN Smoking Cessation  
MEDICATIONS  (PRN):  gabapentin 300 milliGRAM(s) Oral four times a day PRN anxiety  haloperidol     Tablet 5 milliGRAM(s) Oral every 4 hours PRN agitation  haloperidol    Injectable 5 milliGRAM(s) IntraMuscular once PRN severe agitation  nicotine  Polacrilex Gum 4 milliGRAM(s) Oral every 3 hours PRN Smoking Cessation  OLANZapine Injectable 10 milliGRAM(s) IntraMuscular once PRN severe agitation or aggression  
MEDICATIONS  (PRN):  gabapentin 300 milliGRAM(s) Oral four times a day PRN anxiety  haloperidol     Tablet 5 milliGRAM(s) Oral every 4 hours PRN agitation  haloperidol    Injectable 5 milliGRAM(s) IntraMuscular once PRN severe agitation  LORazepam     Tablet 2 milliGRAM(s) Oral every 4 hours PRN Agitation  LORazepam   Injectable 2 milliGRAM(s) IntraMuscular once PRN severe agitation  nicotine  Polacrilex Gum 4 milliGRAM(s) Oral every 3 hours PRN Smoking Cessation  OLANZapine Injectable 10 milliGRAM(s) IntraMuscular once PRN severe agitation or aggression  
MEDICATIONS  (PRN):  gabapentin 300 milliGRAM(s) Oral four times a day PRN anxiety  haloperidol     Tablet 5 milliGRAM(s) Oral every 4 hours PRN agitation  haloperidol    Injectable 5 milliGRAM(s) IntraMuscular once PRN severe agitation  nicotine  Polacrilex Gum 4 milliGRAM(s) Oral every 3 hours PRN Smoking Cessation  OLANZapine Injectable 10 milliGRAM(s) IntraMuscular once PRN severe agitation or aggression  
MEDICATIONS  (PRN):  gabapentin 300 milliGRAM(s) Oral four times a day PRN anxiety  haloperidol     Tablet 5 milliGRAM(s) Oral every 4 hours PRN agitation  haloperidol    Injectable 5 milliGRAM(s) IntraMuscular once PRN severe agitation  LORazepam     Tablet 2 milliGRAM(s) Oral every 4 hours PRN Agitation  LORazepam   Injectable 2 milliGRAM(s) IntraMuscular once PRN severe agitation  nicotine  Polacrilex Gum 4 milliGRAM(s) Oral every 3 hours PRN Smoking Cessation  
MEDICATIONS  (PRN):  gabapentin 300 milliGRAM(s) Oral four times a day PRN anxiety  haloperidol     Tablet 5 milliGRAM(s) Oral every 4 hours PRN agitation  haloperidol    Injectable 5 milliGRAM(s) IntraMuscular once PRN severe agitation  nicotine  Polacrilex Gum 4 milliGRAM(s) Oral every 3 hours PRN Smoking Cessation  OLANZapine Injectable 10 milliGRAM(s) IntraMuscular once PRN severe agitation or aggression  
MEDICATIONS  (PRN):  gabapentin 300 milliGRAM(s) Oral four times a day PRN anxiety  haloperidol     Tablet 5 milliGRAM(s) Oral every 4 hours PRN agitation  haloperidol    Injectable 5 milliGRAM(s) IntraMuscular once PRN severe agitation  nicotine  Polacrilex Gum 4 milliGRAM(s) Oral every 3 hours PRN Smoking Cessation  OLANZapine Injectable 10 milliGRAM(s) IntraMuscular once PRN severe agitation or aggression  
MEDICATIONS  (PRN):  gabapentin 300 milliGRAM(s) Oral four times a day PRN anxiety  haloperidol     Tablet 5 milliGRAM(s) Oral every 4 hours PRN agitation  haloperidol    Injectable 5 milliGRAM(s) IntraMuscular once PRN severe agitation  LORazepam     Tablet 2 milliGRAM(s) Oral every 4 hours PRN Agitation  LORazepam   Injectable 2 milliGRAM(s) IntraMuscular once PRN severe agitation  nicotine  Polacrilex Gum 4 milliGRAM(s) Oral every 3 hours PRN Smoking Cessation  
MEDICATIONS  (PRN):  gabapentin 300 milliGRAM(s) Oral four times a day PRN anxiety  haloperidol     Tablet 5 milliGRAM(s) Oral every 4 hours PRN agitation  haloperidol    Injectable 5 milliGRAM(s) IntraMuscular once PRN severe agitation  nicotine  Polacrilex Gum 4 milliGRAM(s) Oral every 3 hours PRN Smoking Cessation  OLANZapine Injectable 10 milliGRAM(s) IntraMuscular once PRN severe agitation or aggression  
MEDICATIONS  (PRN):  gabapentin 300 milliGRAM(s) Oral four times a day PRN anxiety  haloperidol     Tablet 5 milliGRAM(s) Oral every 4 hours PRN agitation  haloperidol    Injectable 5 milliGRAM(s) IntraMuscular once PRN severe agitation  LORazepam     Tablet 2 milliGRAM(s) Oral every 4 hours PRN Agitation  LORazepam   Injectable 2 milliGRAM(s) IntraMuscular once PRN severe agitation  nicotine  Polacrilex Gum 4 milliGRAM(s) Oral every 3 hours PRN Smoking Cessation  
MEDICATIONS  (PRN):  gabapentin 300 milliGRAM(s) Oral four times a day PRN anxiety  haloperidol     Tablet 5 milliGRAM(s) Oral every 4 hours PRN agitation  haloperidol    Injectable 5 milliGRAM(s) IntraMuscular once PRN severe agitation  nicotine  Polacrilex Gum 4 milliGRAM(s) Oral every 3 hours PRN Smoking Cessation  OLANZapine Injectable 10 milliGRAM(s) IntraMuscular once PRN severe agitation or aggression  
MEDICATIONS  (PRN):  gabapentin 300 milliGRAM(s) Oral four times a day PRN anxiety  haloperidol     Tablet 5 milliGRAM(s) Oral every 4 hours PRN agitation  haloperidol    Injectable 5 milliGRAM(s) IntraMuscular once PRN severe agitation  LORazepam     Tablet 2 milliGRAM(s) Oral every 4 hours PRN Agitation  LORazepam   Injectable 2 milliGRAM(s) IntraMuscular once PRN severe agitation  nicotine  Polacrilex Gum 4 milliGRAM(s) Oral every 3 hours PRN Smoking Cessation  OLANZapine Injectable 10 milliGRAM(s) IntraMuscular once PRN severe agitation or aggression  
MEDICATIONS  (PRN):  gabapentin 300 milliGRAM(s) Oral four times a day PRN anxiety  haloperidol     Tablet 5 milliGRAM(s) Oral every 4 hours PRN agitation  haloperidol    Injectable 5 milliGRAM(s) IntraMuscular once PRN severe agitation  nicotine  Polacrilex Gum 4 milliGRAM(s) Oral every 3 hours PRN Smoking Cessation  OLANZapine Injectable 10 milliGRAM(s) IntraMuscular once PRN severe agitation or aggression

## 2024-08-19 NOTE — BH INPATIENT PSYCHIATRY PROGRESS NOTE - NSTXDCOPLKDATEEST_PSY_ALL_CORE
23-Jul-2024
23-Jul-2024
01-Aug-2024
23-Jul-2024
15-Aug-2024
30-Jul-2024
15-Aug-2024
30-Jul-2024
01-Aug-2024
23-Jul-2024
01-Aug-2024
23-Jul-2024
01-Aug-2024
01-Aug-2024
23-Jul-2024
01-Aug-2024
15-Aug-2024

## 2024-08-19 NOTE — BH INPATIENT PSYCHIATRY PROGRESS NOTE - NSICDXBHSECONDARYDX_PSY_ALL_CORE
Cannabis use disorder   F12.90  
Cannabis use disorder   F12.90  Facial numbness   R20.0  
Cannabis use disorder   F12.90  
Cannabis use disorder   F12.90  Facial numbness   R20.0  
Cannabis use disorder   F12.90  

## 2024-08-19 NOTE — BH INPATIENT PSYCHIATRY PROGRESS NOTE - NSTXDCOPLKDATETRGT_PSY_ALL_CORE
06-Aug-2024
30-Jul-2024
15-Aug-2024
08-Aug-2024
15-Aug-2024
22-Aug-2024
30-Jul-2024
06-Aug-2024
22-Aug-2024
30-Jul-2024
22-Aug-2024
15-Aug-2024
30-Jul-2024
15-Aug-2024
08-Aug-2024
15-Aug-2024
30-Jul-2024
30-Jul-2024
08-Aug-2024

## 2024-08-19 NOTE — BH INPATIENT PSYCHIATRY PROGRESS NOTE - NSICDXBHPRIMARYDX_PSY_ALL_CORE
Psychosis, unspecified psychosis type   F29  

## 2024-08-19 NOTE — BH INPATIENT PSYCHIATRY PROGRESS NOTE - NSDCCRITERIA_PSY_ALL_CORE
cgi<=2  ANDERSON for safety and compliance
cgi<=2  ANDERSON for safety and compliance
cgi<=2  ANDERSON for safety and compliance with no psychosis and aftercare, ideally with insight
cgi<=2  ANDERSON for safety and compliance
cgi<=2  ANDERSON for safety and compliance with no psychosis
cgi<=2  ANDERSON for safety and compliance
cgi<=2  ANDERSON for safety and compliance with no psychosis and aftercare, ideally with insight
cgi<=2  ANDERSON for safety and compliance
cgi<=2  ANDERSON for safety and compliance
cgi<=2  ANDERSON for safety and compliance with no psychosis
cgi<=2  ANDERSON for safety and compliance with no psychosis and aftercare, ideally with insight
cgi<=2  ANDERSON for safety and compliance
cgi<=2  ANDERSON for safety and compliance with no psychosis
cgi<=2  ANDERSON for safety and compliance

## 2024-08-19 NOTE — BH INPATIENT PSYCHIATRY PROGRESS NOTE - NSTXDISORGPROGRES_PSY_ALL_CORE
Improving
No Change
Improving
No Change
Improving
No Change
Improving
No Change
Improving
No Change
Improving
Improving

## 2024-08-19 NOTE — BH INPATIENT PSYCHIATRY PROGRESS NOTE - MSE OPTIONS
Structured MSE
Unstructured MSE
Structured MSE
Unstructured MSE
Structured MSE

## 2024-08-19 NOTE — BH INPATIENT PSYCHIATRY PROGRESS NOTE - NSTXSUPORTDATETRGT_PSY_ALL_CORE
08-Aug-2024
01-Aug-2024
08-Aug-2024
01-Aug-2024
08-Aug-2024
08-Aug-2024
01-Aug-2024
08-Aug-2024
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
## 2024-08-19 NOTE — BH INPATIENT PSYCHIATRY PROGRESS NOTE - NSTXCOPEDATETRGT_PSY_ALL_CORE
01-Aug-2024

## 2024-08-19 NOTE — BH INPATIENT PSYCHIATRY PROGRESS NOTE - NSTXSUBMISDATEEST_PSY_ALL_CORE
25-Jul-2024

## 2024-08-19 NOTE — BH INPATIENT PSYCHIATRY PROGRESS NOTE - NSTXPROBTOBACO_PSY_ALL_CORE
TOBACCO/NICOTINE USE

## 2024-08-19 NOTE — BH INPATIENT PSYCHIATRY PROGRESS NOTE - NSTXANXPROGRES_PSY_ALL_CORE
Improving
No Change
Improving
No Change
Improving
No Change
No Change

## 2024-08-19 NOTE — BH INPATIENT PSYCHIATRY PROGRESS NOTE - NSBHMETABOLIC_PSY_ALL_CORE_FT
BMI: BMI (kg/m2): 23 (07-23-24 @ 11:45)  HbA1c: A1C with Estimated Average Glucose Result: 5.1 % (07-24-24 @ 09:00)    Glucose:   BP: 108/61 (07-23-24 @ 07:34) (88/54 - 130/81)Vital Signs Last 24 Hrs  T(C): 36.1 (07-24-24 @ 08:04), Max: 36.1 (07-24-24 @ 08:04)  T(F): 97 (07-24-24 @ 08:04), Max: 97 (07-24-24 @ 08:04)  HR: --  BP: --  BP(mean): --  RR: --  SpO2: --    Orthostatic VS  07-24-24 @ 08:04  Lying BP: --/-- HR: --  Sitting BP: 103/64 HR: 65  Standing BP: --/-- HR: --  Site: --  Mode: --  Orthostatic VS  07-23-24 @ 11:45  Lying BP: --/-- HR: --  Sitting BP: 126/70 HR: 65  Standing BP: 116/74 HR: 68  Site: --  Mode: --    Lipid Panel: Date/Time: 07-24-24 @ 09:00  Cholesterol, Serum: 154  LDL Cholesterol Calculated: 60  HDL Cholesterol, Serum: 52  Total Cholesterol/HDL Ration Measurement: --  Triglycerides, Serum: 211  
BMI: BMI (kg/m2): 23 (07-23-24 @ 11:45)  HbA1c: A1C with Estimated Average Glucose Result: 5.1 % (07-24-24 @ 09:00)    Glucose:   BP: 118/78 (08-15-24 @ 06:45) (118/78 - 118/78)Vital Signs Last 24 Hrs  T(C): --  T(F): --  HR: --  BP: --  BP(mean): --  RR: --  SpO2: --      Lipid Panel: Date/Time: 07-24-24 @ 09:00  Cholesterol, Serum: 154  LDL Cholesterol Calculated: 60  HDL Cholesterol, Serum: 52  Total Cholesterol/HDL Ration Measurement: --  Triglycerides, Serum: 211  
BMI: BMI (kg/m2): 23 (07-23-24 @ 11:45)  HbA1c: A1C with Estimated Average Glucose Result: 5.1 % (07-24-24 @ 09:00)    Glucose:   BP: 114/65 (08-09-24 @ 07:49) (114/65 - 114/65)Vital Signs Last 24 Hrs  T(C): 36.4 (08-09-24 @ 07:49), Max: 36.4 (08-09-24 @ 07:49)  T(F): 97.5 (08-09-24 @ 07:49), Max: 97.5 (08-09-24 @ 07:49)  HR: 81 (08-09-24 @ 07:49) (81 - 81)  BP: 114/65 (08-09-24 @ 07:49) (114/65 - 114/65)  BP(mean): --  RR: --  SpO2: --    Orthostatic VS  08-08-24 @ 07:46  Lying BP: --/-- HR: --  Sitting BP: 115/82 HR: 75  Standing BP: --/-- HR: --  Site: --  Mode: --    Lipid Panel: Date/Time: 07-24-24 @ 09:00  Cholesterol, Serum: 154  LDL Cholesterol Calculated: 60  HDL Cholesterol, Serum: 52  Total Cholesterol/HDL Ration Measurement: --  Triglycerides, Serum: 211  
BMI: BMI (kg/m2): 23 (07-23-24 @ 11:45)  HbA1c: A1C with Estimated Average Glucose Result: 5.1 % (07-24-24 @ 09:00)    Glucose:   BP: 137/70 (07-28-24 @ 08:34) (137/70 - 137/70)Vital Signs Last 24 Hrs  T(C): 36.6 (07-28-24 @ 08:34), Max: 36.6 (07-28-24 @ 08:34)  T(F): 97.8 (07-28-24 @ 08:34), Max: 97.8 (07-28-24 @ 08:34)  HR: 90 (07-28-24 @ 08:34) (90 - 90)  BP: 137/70 (07-28-24 @ 08:34) (137/70 - 137/70)  BP(mean): --  RR: --  SpO2: --      Lipid Panel: Date/Time: 07-24-24 @ 09:00  Cholesterol, Serum: 154  LDL Cholesterol Calculated: 60  HDL Cholesterol, Serum: 52  Total Cholesterol/HDL Ration Measurement: --  Triglycerides, Serum: 211  
BMI: BMI (kg/m2): 23 (07-23-24 @ 11:45)  HbA1c: A1C with Estimated Average Glucose Result: 5.1 % (07-24-24 @ 09:00)    Glucose:   BP: 141/91 (08-02-24 @ 08:00) (103/84 - 141/91)Vital Signs Last 24 Hrs  T(C): 35.9 (08-02-24 @ 08:00), Max: 35.9 (08-02-24 @ 08:00)  T(F): 96.6 (08-02-24 @ 08:00), Max: 96.6 (08-02-24 @ 08:00)  HR: 92 (08-02-24 @ 08:00) (92 - 92)  BP: 141/91 (08-02-24 @ 08:00) (141/91 - 141/91)  BP(mean): --  RR: --  SpO2: --    Orthostatic VS  08-01-24 @ 07:46  Lying BP: --/-- HR: --  Sitting BP: 133/75 HR: 84  Standing BP: --/-- HR: --  Site: --  Mode: --    Lipid Panel: Date/Time: 07-24-24 @ 09:00  Cholesterol, Serum: 154  LDL Cholesterol Calculated: 60  HDL Cholesterol, Serum: 52  Total Cholesterol/HDL Ration Measurement: --  Triglycerides, Serum: 211  
BMI: BMI (kg/m2): 23 (07-23-24 @ 11:45)  HbA1c: A1C with Estimated Average Glucose Result: 5.1 % (07-24-24 @ 09:00)    Glucose:   BP: 127/78 (08-12-24 @ 07:51) (115/72 - 127/78)Vital Signs Last 24 Hrs  T(C): --  T(F): --  HR: --  BP: --  BP(mean): --  RR: --  SpO2: --      Lipid Panel: Date/Time: 07-24-24 @ 09:00  Cholesterol, Serum: 154  LDL Cholesterol Calculated: 60  HDL Cholesterol, Serum: 52  Total Cholesterol/HDL Ration Measurement: --  Triglycerides, Serum: 211  
BMI: BMI (kg/m2): 23 (07-23-24 @ 11:45)  HbA1c: A1C with Estimated Average Glucose Result: 5.1 % (07-24-24 @ 09:00)    Glucose:   BP: 103/84 (07-31-24 @ 07:51) (103/84 - 103/84)Vital Signs Last 24 Hrs  T(C): 36.3 (08-01-24 @ 07:46), Max: 36.3 (08-01-24 @ 07:46)  T(F): 97.4 (08-01-24 @ 07:46), Max: 97.4 (08-01-24 @ 07:46)  HR: --  BP: --  BP(mean): --  RR: --  SpO2: --    Orthostatic VS  08-01-24 @ 07:46  Lying BP: --/-- HR: --  Sitting BP: 133/75 HR: 84  Standing BP: --/-- HR: --  Site: --  Mode: --    Lipid Panel: Date/Time: 07-24-24 @ 09:00  Cholesterol, Serum: 154  LDL Cholesterol Calculated: 60  HDL Cholesterol, Serum: 52  Total Cholesterol/HDL Ration Measurement: --  Triglycerides, Serum: 211  
BMI: BMI (kg/m2): 23 (07-23-24 @ 11:45)  HbA1c: A1C with Estimated Average Glucose Result: 5.1 % (07-24-24 @ 09:00)    Glucose:   BP: 127/78 (08-12-24 @ 07:51) (115/72 - 127/78)Vital Signs Last 24 Hrs  T(C): 36.9 (08-12-24 @ 07:51), Max: 36.9 (08-12-24 @ 07:51)  T(F): 98.4 (08-12-24 @ 07:51), Max: 98.4 (08-12-24 @ 07:51)  HR: 76 (08-12-24 @ 07:51) (76 - 76)  BP: 127/78 (08-12-24 @ 07:51) (127/78 - 127/78)  BP(mean): --  RR: --  SpO2: --      Lipid Panel: Date/Time: 07-24-24 @ 09:00  Cholesterol, Serum: 154  LDL Cholesterol Calculated: 60  HDL Cholesterol, Serum: 52  Total Cholesterol/HDL Ration Measurement: --  Triglycerides, Serum: 211  
BMI: BMI (kg/m2): 23 (07-23-24 @ 11:45)  HbA1c: A1C with Estimated Average Glucose Result: 5.1 % (07-24-24 @ 09:00)    Glucose:   BP: 137/70 (07-28-24 @ 08:34) (137/70 - 137/70)Vital Signs Last 24 Hrs  T(C): --  T(F): --  HR: --  BP: --  BP(mean): --  RR: --  SpO2: --      Lipid Panel: Date/Time: 07-24-24 @ 09:00  Cholesterol, Serum: 154  LDL Cholesterol Calculated: 60  HDL Cholesterol, Serum: 52  Total Cholesterol/HDL Ration Measurement: --  Triglycerides, Serum: 211  
BMI: BMI (kg/m2): 23 (07-23-24 @ 11:45)  HbA1c: A1C with Estimated Average Glucose Result: 5.1 % (07-24-24 @ 09:00)    Glucose:   BP: --Vital Signs Last 24 Hrs  T(C): --  T(F): --  HR: --  BP: --  BP(mean): --  RR: --  SpO2: --    Orthostatic VS  08-18-24 @ 07:41  Lying BP: --/-- HR: --  Sitting BP: 133/88 HR: 87  Standing BP: 119/74 HR: 85  Site: --  Mode: --    Lipid Panel: Date/Time: 07-24-24 @ 09:00  Cholesterol, Serum: 154  LDL Cholesterol Calculated: 60  HDL Cholesterol, Serum: 52  Total Cholesterol/HDL Ration Measurement: --  Triglycerides, Serum: 211  
BMI: BMI (kg/m2): 23 (07-23-24 @ 11:45)  HbA1c: A1C with Estimated Average Glucose Result: 5.1 % (07-24-24 @ 09:00)    Glucose:   BP: --Vital Signs Last 24 Hrs  T(C): 36.8 (08-08-24 @ 07:46), Max: 36.8 (08-08-24 @ 07:46)  T(F): 98.2 (08-08-24 @ 07:46), Max: 98.2 (08-08-24 @ 07:46)  HR: --  BP: --  BP(mean): --  RR: --  SpO2: --    Orthostatic VS  08-08-24 @ 07:46  Lying BP: --/-- HR: --  Sitting BP: 115/82 HR: 75  Standing BP: --/-- HR: --  Site: --  Mode: --    Lipid Panel: Date/Time: 07-24-24 @ 09:00  Cholesterol, Serum: 154  LDL Cholesterol Calculated: 60  HDL Cholesterol, Serum: 52  Total Cholesterol/HDL Ration Measurement: --  Triglycerides, Serum: 211  
BMI: BMI (kg/m2): 23 (07-23-24 @ 11:45)  HbA1c: A1C with Estimated Average Glucose Result: 5.1 % (07-24-24 @ 09:00)    Glucose:   BP: 103/84 (07-31-24 @ 07:51) (103/84 - 103/84)Vital Signs Last 24 Hrs  T(C): 36.1 (07-31-24 @ 07:51), Max: 36.1 (07-31-24 @ 07:51)  T(F): 97 (07-31-24 @ 07:51), Max: 97 (07-31-24 @ 07:51)  HR: --  BP: 103/84 (07-31-24 @ 07:51) (103/84 - 103/84)  BP(mean): 76 (07-31-24 @ 07:51) (76 - 76)  RR: --  SpO2: --      Lipid Panel: Date/Time: 07-24-24 @ 09:00  Cholesterol, Serum: 154  LDL Cholesterol Calculated: 60  HDL Cholesterol, Serum: 52  Total Cholesterol/HDL Ration Measurement: --  Triglycerides, Serum: 211  
BMI: BMI (kg/m2): 23 (07-23-24 @ 11:45)  HbA1c: A1C with Estimated Average Glucose Result: 5.1 % (07-24-24 @ 09:00)    Glucose:   BP: 137/70 (07-28-24 @ 08:34) (137/70 - 137/70)Vital Signs Last 24 Hrs  T(C): --  T(F): --  HR: --  BP: --  BP(mean): --  RR: --  SpO2: --      Lipid Panel: Date/Time: 07-24-24 @ 09:00  Cholesterol, Serum: 154  LDL Cholesterol Calculated: 60  HDL Cholesterol, Serum: 52  Total Cholesterol/HDL Ration Measurement: --  Triglycerides, Serum: 211  
BMI: BMI (kg/m2): 23 (07-23-24 @ 11:45)  HbA1c: A1C with Estimated Average Glucose Result: 5.1 % (07-24-24 @ 09:00)    Glucose:   BP: 118/78 (08-15-24 @ 06:45) (118/78 - 118/78)Vital Signs Last 24 Hrs  T(C): 36.6 (08-15-24 @ 06:45), Max: 36.6 (08-15-24 @ 06:45)  T(F): 97.9 (08-15-24 @ 06:45), Max: 97.9 (08-15-24 @ 06:45)  HR: 73 (08-15-24 @ 06:45) (73 - 73)  BP: 118/78 (08-15-24 @ 06:45) (118/78 - 118/78)  BP(mean): --  RR: --  SpO2: --    Orthostatic VS  08-14-24 @ 07:35  Lying BP: --/-- HR: --  Sitting BP: 124/70 HR: 81  Standing BP: --/-- HR: --  Site: --  Mode: --    Lipid Panel: Date/Time: 07-24-24 @ 09:00  Cholesterol, Serum: 154  LDL Cholesterol Calculated: 60  HDL Cholesterol, Serum: 52  Total Cholesterol/HDL Ration Measurement: --  Triglycerides, Serum: 211  
BMI: BMI (kg/m2): 23 (07-23-24 @ 11:45)  HbA1c: A1C with Estimated Average Glucose Result: 5.1 % (07-24-24 @ 09:00)    Glucose:   BP: --Vital Signs Last 24 Hrs  T(C): 36.4 (08-05-24 @ 07:31), Max: 36.4 (08-05-24 @ 07:31)  T(F): 97.5 (08-05-24 @ 07:31), Max: 97.5 (08-05-24 @ 07:31)  HR: --  BP: --  BP(mean): --  RR: --  SpO2: --    Orthostatic VS  08-05-24 @ 07:31  Lying BP: --/-- HR: --  Sitting BP: 122/67 HR: 79  Standing BP: --/-- HR: --  Site: --  Mode: --    Lipid Panel: Date/Time: 07-24-24 @ 09:00  Cholesterol, Serum: 154  LDL Cholesterol Calculated: 60  HDL Cholesterol, Serum: 52  Total Cholesterol/HDL Ration Measurement: --  Triglycerides, Serum: 211  
BMI: BMI (kg/m2): 23 (07-23-24 @ 11:45)  HbA1c: A1C with Estimated Average Glucose Result: 5.1 % (07-24-24 @ 09:00)    Glucose:   BP: 127/78 (08-12-24 @ 07:51) (127/78 - 127/78)Vital Signs Last 24 Hrs  T(C): 36.9 (08-14-24 @ 07:35), Max: 36.9 (08-14-24 @ 07:35)  T(F): 98.4 (08-14-24 @ 07:35), Max: 98.4 (08-14-24 @ 07:35)  HR: --  BP: --  BP(mean): --  RR: --  SpO2: --    Orthostatic VS  08-14-24 @ 07:35  Lying BP: --/-- HR: --  Sitting BP: 124/70 HR: 81  Standing BP: --/-- HR: --  Site: --  Mode: --    Lipid Panel: Date/Time: 07-24-24 @ 09:00  Cholesterol, Serum: 154  LDL Cholesterol Calculated: 60  HDL Cholesterol, Serum: 52  Total Cholesterol/HDL Ration Measurement: --  Triglycerides, Serum: 211  
BMI: BMI (kg/m2): 23 (07-23-24 @ 11:45)  HbA1c: A1C with Estimated Average Glucose Result: 5.1 % (07-24-24 @ 09:00)    Glucose:   BP: --Vital Signs Last 24 Hrs  T(C): --  T(F): --  HR: --  BP: --  BP(mean): --  RR: --  SpO2: --    Orthostatic VS  08-05-24 @ 07:31  Lying BP: --/-- HR: --  Sitting BP: 122/67 HR: 79  Standing BP: --/-- HR: --  Site: --  Mode: --    Lipid Panel: Date/Time: 07-24-24 @ 09:00  Cholesterol, Serum: 154  LDL Cholesterol Calculated: 60  HDL Cholesterol, Serum: 52  Total Cholesterol/HDL Ration Measurement: --  Triglycerides, Serum: 211  
BMI: BMI (kg/m2): 23 (07-23-24 @ 11:45)  HbA1c: A1C with Estimated Average Glucose Result: 5.1 % (07-24-24 @ 09:00)    Glucose:   BP: --Vital Signs Last 24 Hrs  T(C): --  T(F): --  HR: --  BP: --  BP(mean): --  RR: --  SpO2: --      Lipid Panel: Date/Time: 07-24-24 @ 09:00  Cholesterol, Serum: 154  LDL Cholesterol Calculated: 60  HDL Cholesterol, Serum: 52  Total Cholesterol/HDL Ration Measurement: --  Triglycerides, Serum: 211  
BMI: BMI (kg/m2): 23 (07-23-24 @ 11:45)  HbA1c: A1C with Estimated Average Glucose Result: 5.1 % (07-24-24 @ 09:00)    Glucose:   BP: --Vital Signs Last 24 Hrs  T(C): --  T(F): --  HR: --  BP: --  BP(mean): --  RR: --  SpO2: --      Lipid Panel: Date/Time: 07-24-24 @ 09:00  Cholesterol, Serum: 154  LDL Cholesterol Calculated: 60  HDL Cholesterol, Serum: 52  Total Cholesterol/HDL Ration Measurement: --  Triglycerides, Serum: 211  
BMI: BMI (kg/m2): 23 (07-23-24 @ 11:45)  HbA1c: A1C with Estimated Average Glucose Result: 5.1 % (07-24-24 @ 09:00)    Glucose:   BP: 108/61 (07-23-24 @ 07:34) (108/61 - 113/74)Vital Signs Last 24 Hrs  T(C): 36.3 (07-25-24 @ 08:06), Max: 36.3 (07-25-24 @ 08:06)  T(F): 97.3 (07-25-24 @ 08:06), Max: 97.3 (07-25-24 @ 08:06)  HR: --  BP: --  BP(mean): --  RR: --  SpO2: --    Orthostatic VS  07-25-24 @ 08:06  Lying BP: --/-- HR: --  Sitting BP: 105/61 HR: 62  Standing BP: --/-- HR: --  Site: --  Mode: --  Orthostatic VS  07-24-24 @ 08:04  Lying BP: --/-- HR: --  Sitting BP: 103/64 HR: 65  Standing BP: --/-- HR: --  Site: --  Mode: --    Lipid Panel: Date/Time: 07-24-24 @ 09:00  Cholesterol, Serum: 154  LDL Cholesterol Calculated: 60  HDL Cholesterol, Serum: 52  Total Cholesterol/HDL Ration Measurement: --  Triglycerides, Serum: 211  
BMI: BMI (kg/m2): 23 (07-23-24 @ 11:45)  HbA1c: A1C with Estimated Average Glucose Result: 5.1 % (07-24-24 @ 09:00)    Glucose:   BP: --Vital Signs Last 24 Hrs  T(C): --  T(F): --  HR: --  BP: --  BP(mean): --  RR: --  SpO2: --      Lipid Panel: Date/Time: 07-24-24 @ 09:00  Cholesterol, Serum: 154  LDL Cholesterol Calculated: 60  HDL Cholesterol, Serum: 52  Total Cholesterol/HDL Ration Measurement: --  Triglycerides, Serum: 211

## 2024-08-19 NOTE — BH INPATIENT PSYCHIATRY PROGRESS NOTE - NSTXDISORGDATEEST_PSY_ALL_CORE
24-Jul-2024

## 2024-08-19 NOTE — BH INPATIENT PSYCHIATRY PROGRESS NOTE - NSTXTOBACODATETRGT_PSY_ALL_CORE
01-Aug-2024
08-Aug-2024
08-Aug-2024
01-Aug-2024
08-Aug-2024
08-Aug-2024
01-Aug-2024
08-Aug-2024
08-Aug-2024
01-Aug-2024
08-Aug-2024
01-Aug-2024
01-Aug-2024
08-Aug-2024
08-Aug-2024

## 2024-08-19 NOTE — BH INPATIENT PSYCHIATRY PROGRESS NOTE - NSTXDISORGDATETRGT_PSY_ALL_CORE
08-Aug-2024
14-Aug-2024
08-Aug-2024
21-Aug-2024
08-Aug-2024
21-Aug-2024
21-Aug-2024
14-Aug-2024
21-Aug-2024
14-Aug-2024
07-Aug-2024
07-Aug-2024
08-Aug-2024
07-Aug-2024
08-Aug-2024
31-Jul-2024
07-Aug-2024
07-Aug-2024
14-Aug-2024
08-Aug-2024
14-Aug-2024

## 2024-08-19 NOTE — BH INPATIENT PSYCHIATRY PROGRESS NOTE - NSBHCHARTREVIEWVS_PSY_A_CORE FT
Vital Signs Last 24 Hrs  T(C): 36.1 (07-24-24 @ 08:04), Max: 36.1 (07-24-24 @ 08:04)  T(F): 97 (07-24-24 @ 08:04), Max: 97 (07-24-24 @ 08:04)  HR: --  BP: --  BP(mean): --  RR: --  SpO2: --    Orthostatic VS  07-24-24 @ 08:04  Lying BP: --/-- HR: --  Sitting BP: 103/64 HR: 65  Standing BP: --/-- HR: --  Site: --  Mode: --  Orthostatic VS  07-23-24 @ 11:45  Lying BP: --/-- HR: --  Sitting BP: 126/70 HR: 65  Standing BP: 116/74 HR: 68  Site: --  Mode: --  
Vital Signs Last 24 Hrs  T(C): --  T(F): --  HR: --  BP: --  BP(mean): --  RR: --  SpO2: --    
Vital Signs Last 24 Hrs  T(C): 36.8 (08-08-24 @ 07:46), Max: 36.8 (08-08-24 @ 07:46)  T(F): 98.2 (08-08-24 @ 07:46), Max: 98.2 (08-08-24 @ 07:46)  HR: --  BP: --  BP(mean): --  RR: --  SpO2: --    Orthostatic VS  08-08-24 @ 07:46  Lying BP: --/-- HR: --  Sitting BP: 115/82 HR: 75  Standing BP: --/-- HR: --  Site: --  Mode: --  
Vital Signs Last 24 Hrs  T(C): --  T(F): --  HR: --  BP: --  BP(mean): --  RR: --  SpO2: --    
Vital Signs Last 24 Hrs  T(C): 36.1 (07-31-24 @ 07:51), Max: 36.1 (07-31-24 @ 07:51)  T(F): 97 (07-31-24 @ 07:51), Max: 97 (07-31-24 @ 07:51)  HR: --  BP: 103/84 (07-31-24 @ 07:51) (103/84 - 103/84)  BP(mean): 76 (07-31-24 @ 07:51) (76 - 76)  RR: --  SpO2: --    
Vital Signs Last 24 Hrs  T(C): 36.9 (08-12-24 @ 07:51), Max: 36.9 (08-12-24 @ 07:51)  T(F): 98.4 (08-12-24 @ 07:51), Max: 98.4 (08-12-24 @ 07:51)  HR: 76 (08-12-24 @ 07:51) (76 - 76)  BP: 127/78 (08-12-24 @ 07:51) (127/78 - 127/78)  BP(mean): --  RR: --  SpO2: --    
Vital Signs Last 24 Hrs  T(C): --  T(F): --  HR: --  BP: --  BP(mean): --  RR: --  SpO2: --    Orthostatic VS  08-18-24 @ 07:41  Lying BP: --/-- HR: --  Sitting BP: 133/88 HR: 87  Standing BP: 119/74 HR: 85  Site: --  Mode: --  
Vital Signs Last 24 Hrs  T(C): --  T(F): --  HR: --  BP: --  BP(mean): --  RR: --  SpO2: --    
Vital Signs Last 24 Hrs  T(C): 36.3 (08-01-24 @ 07:46), Max: 36.3 (08-01-24 @ 07:46)  T(F): 97.4 (08-01-24 @ 07:46), Max: 97.4 (08-01-24 @ 07:46)  HR: --  BP: --  BP(mean): --  RR: --  SpO2: --    Orthostatic VS  08-01-24 @ 07:46  Lying BP: --/-- HR: --  Sitting BP: 133/75 HR: 84  Standing BP: --/-- HR: --  Site: --  Mode: --  
Vital Signs Last 24 Hrs  T(C): --  T(F): --  HR: --  BP: --  BP(mean): --  RR: --  SpO2: --    
Vital Signs Last 24 Hrs  T(C): 36.6 (07-28-24 @ 08:34), Max: 36.6 (07-28-24 @ 08:34)  T(F): 97.8 (07-28-24 @ 08:34), Max: 97.8 (07-28-24 @ 08:34)  HR: 90 (07-28-24 @ 08:34) (90 - 90)  BP: 137/70 (07-28-24 @ 08:34) (137/70 - 137/70)  BP(mean): --  RR: --  SpO2: --    
Vital Signs Last 24 Hrs  T(C): 36.6 (08-15-24 @ 06:45), Max: 36.6 (08-15-24 @ 06:45)  T(F): 97.9 (08-15-24 @ 06:45), Max: 97.9 (08-15-24 @ 06:45)  HR: 73 (08-15-24 @ 06:45) (73 - 73)  BP: 118/78 (08-15-24 @ 06:45) (118/78 - 118/78)  BP(mean): --  RR: --  SpO2: --    Orthostatic VS  08-14-24 @ 07:35  Lying BP: --/-- HR: --  Sitting BP: 124/70 HR: 81  Standing BP: --/-- HR: --  Site: --  Mode: --  
Vital Signs Last 24 Hrs  T(C): --  T(F): --  HR: --  BP: --  BP(mean): --  RR: --  SpO2: --    
Vital Signs Last 24 Hrs  T(C): 35.9 (08-02-24 @ 08:00), Max: 35.9 (08-02-24 @ 08:00)  T(F): 96.6 (08-02-24 @ 08:00), Max: 96.6 (08-02-24 @ 08:00)  HR: 92 (08-02-24 @ 08:00) (92 - 92)  BP: 141/91 (08-02-24 @ 08:00) (141/91 - 141/91)  BP(mean): --  RR: --  SpO2: --    Orthostatic VS  08-01-24 @ 07:46  Lying BP: --/-- HR: --  Sitting BP: 133/75 HR: 84  Standing BP: --/-- HR: --  Site: --  Mode: --  
Vital Signs Last 24 Hrs  T(C): 36.3 (07-25-24 @ 08:06), Max: 36.3 (07-25-24 @ 08:06)  T(F): 97.3 (07-25-24 @ 08:06), Max: 97.3 (07-25-24 @ 08:06)  HR: --  BP: --  BP(mean): --  RR: --  SpO2: --    Orthostatic VS  07-25-24 @ 08:06  Lying BP: --/-- HR: --  Sitting BP: 105/61 HR: 62  Standing BP: --/-- HR: --  Site: --  Mode: --  Orthostatic VS  07-24-24 @ 08:04  Lying BP: --/-- HR: --  Sitting BP: 103/64 HR: 65  Standing BP: --/-- HR: --  Site: --  Mode: --  
Vital Signs Last 24 Hrs  T(C): --  T(F): --  HR: --  BP: --  BP(mean): --  RR: --  SpO2: --    
Vital Signs Last 24 Hrs  T(C): 36.9 (08-14-24 @ 07:35), Max: 36.9 (08-14-24 @ 07:35)  T(F): 98.4 (08-14-24 @ 07:35), Max: 98.4 (08-14-24 @ 07:35)  HR: --  BP: --  BP(mean): --  RR: --  SpO2: --    Orthostatic VS  08-14-24 @ 07:35  Lying BP: --/-- HR: --  Sitting BP: 124/70 HR: 81  Standing BP: --/-- HR: --  Site: --  Mode: --  
Vital Signs Last 24 Hrs  T(C): --  T(F): --  HR: --  BP: --  BP(mean): --  RR: --  SpO2: --    
Vital Signs Last 24 Hrs  T(C): 36.4 (08-05-24 @ 07:31), Max: 36.4 (08-05-24 @ 07:31)  T(F): 97.5 (08-05-24 @ 07:31), Max: 97.5 (08-05-24 @ 07:31)  HR: --  BP: --  BP(mean): --  RR: --  SpO2: --    Orthostatic VS  08-05-24 @ 07:31  Lying BP: --/-- HR: --  Sitting BP: 122/67 HR: 79  Standing BP: --/-- HR: --  Site: --  Mode: --  
Vital Signs Last 24 Hrs  T(C): 36.4 (08-09-24 @ 07:49), Max: 36.4 (08-09-24 @ 07:49)  T(F): 97.5 (08-09-24 @ 07:49), Max: 97.5 (08-09-24 @ 07:49)  HR: 81 (08-09-24 @ 07:49) (81 - 81)  BP: 114/65 (08-09-24 @ 07:49) (114/65 - 114/65)  BP(mean): --  RR: --  SpO2: --    Orthostatic VS  08-08-24 @ 07:46  Lying BP: --/-- HR: --  Sitting BP: 115/82 HR: 75  Standing BP: --/-- HR: --  Site: --  Mode: --  
Vital Signs Last 24 Hrs  T(C): --  T(F): --  HR: --  BP: --  BP(mean): --  RR: --  SpO2: --    Orthostatic VS  08-05-24 @ 07:31  Lying BP: --/-- HR: --  Sitting BP: 122/67 HR: 79  Standing BP: --/-- HR: --  Site: --  Mode: --

## 2024-08-19 NOTE — BH INPATIENT PSYCHIATRY PROGRESS NOTE - NSBHTIMEACTIVITIESPERFORMED_PSY_A_CORE
case review in AM  psychopharm and supportive therapy, phone meeting with case review with mother  updated inpt direct, Dispo, Rx meds to [facility]
case review in AM  supportive therapy and psychoed on [facility] options  collateral from mother reached in PM and case reviewed, family phone meeting MON at 11:30 AM
case review in AM  supportive therapy and psychoed on [facility] options  discharge documents and Rx meds sent
case review in AM  psychopharm and supportive therapy  very oppositional, may require MOO

## 2024-08-19 NOTE — BH INPATIENT PSYCHIATRY PROGRESS NOTE - NSTXDCOPLKPROGRES_PSY_ALL_CORE
No Change
Improving
Improving
No Change
Improving
No Change
Improving
No Change

## 2024-08-20 VITALS — TEMPERATURE: 97 F

## 2024-08-20 RX ADMIN — PALIPERIDONE 9 MILLIGRAM(S): 3 TABLET, EXTENDED RELEASE ORAL at 09:04

## 2024-08-20 NOTE — BH CHART NOTE - NSEVENTNOTEFT_PSY_ALL_CORE
See DC Summary for Final PN from day of Discharge 8/20/24    [facility] loaded  Rx meds given  Aftercare arranged  Phone meeting held with mother  Return to mother's home

## 2024-09-06 NOTE — BH SOCIAL WORK CONFIRMATION FOLLOW UP NOTE - NSEXCLUSIONS_PSY_ALL_CORE
No Exclusions Apply DISPLAY PLAN FREE TEXT DISPLAY PLAN FREE TEXT DISPLAY PLAN FREE TEXT DISPLAY PLAN FREE TEXT DISPLAY PLAN FREE TEXT DISPLAY PLAN FREE TEXT DISPLAY PLAN FREE TEXT DISPLAY PLAN FREE TEXT